# Patient Record
Sex: MALE | Race: WHITE | NOT HISPANIC OR LATINO | Employment: OTHER | URBAN - NONMETROPOLITAN AREA
[De-identification: names, ages, dates, MRNs, and addresses within clinical notes are randomized per-mention and may not be internally consistent; named-entity substitution may affect disease eponyms.]

---

## 2017-01-26 ENCOUNTER — ANESTHESIA EVENT (OUTPATIENT)
Dept: GASTROENTEROLOGY | Facility: HOSPITAL | Age: 62
End: 2017-01-26

## 2017-01-27 ENCOUNTER — TELEPHONE (OUTPATIENT)
Dept: GASTROENTEROLOGY | Facility: CLINIC | Age: 62
End: 2017-01-27

## 2017-01-27 ENCOUNTER — ANESTHESIA (OUTPATIENT)
Dept: GASTROENTEROLOGY | Facility: HOSPITAL | Age: 62
End: 2017-01-27

## 2017-01-27 PROCEDURE — 25010000002 PROPOFOL 10 MG/ML EMULSION: Performed by: NURSE ANESTHETIST, CERTIFIED REGISTERED

## 2017-01-27 RX ORDER — PROPOFOL 10 MG/ML
VIAL (ML) INTRAVENOUS AS NEEDED
Status: DISCONTINUED | OUTPATIENT
Start: 2017-01-27 | End: 2017-01-27 | Stop reason: SURG

## 2017-01-27 RX ORDER — LIDOCAINE HYDROCHLORIDE 20 MG/ML
INJECTION, SOLUTION INFILTRATION; PERINEURAL AS NEEDED
Status: DISCONTINUED | OUTPATIENT
Start: 2017-01-27 | End: 2017-01-27 | Stop reason: SURG

## 2017-01-27 RX ADMIN — PROPOFOL 400 MG: 10 INJECTION, EMULSION INTRAVENOUS at 09:45

## 2017-01-27 RX ADMIN — LIDOCAINE HYDROCHLORIDE 50 MG: 20 INJECTION, SOLUTION INFILTRATION; PERINEURAL at 09:45

## 2017-01-27 NOTE — ANESTHESIA PREPROCEDURE EVALUATION
Anesthesia Evaluation     Patient summary reviewed    No history of anesthetic complications   Airway   Mallampati: II  TM distance: >3 FB  Neck ROM: full  no difficulty expected  Dental - normal exam     Pulmonary - normal exam   Cardiovascular - negative cardio ROS and normal exam    Neuro/Psych- negative ROS  GI/Hepatic/Renal/Endo - negative ROS     Musculoskeletal (-) negative ROS    Abdominal  - normal exam   Substance History - negative use     OB/GYN negative ob/gyn ROS         Other - negative ROS                            Anesthesia Plan    ASA 1     general     intravenous induction   Anesthetic plan and risks discussed with patient.

## 2017-01-27 NOTE — ANESTHESIA POSTPROCEDURE EVALUATION
Patient: Miguelangel Avalos    Procedure Summary     Date Anesthesia Start Anesthesia Stop Room / Location    01/27/17 0944 1005  [facility] /  [facility]       Procedure Diagnosis Surgeon Provider    COLONOSCOPY WITH ANESTHESIA (N/A ) Family hx colonic polyps  (Family hx colonic polyps [Z83.71]) MD Jero Mayo, THOMAS          Anesthesia Type: general  Last vitals  BP      Temp      Pulse     Resp      SpO2        Post Anesthesia Care and Evaluation    Patient location during evaluation: PHASE II  Patient participation: complete - patient participated  Level of consciousness: awake  Pain management: adequate  Airway patency: patent  Anesthetic complications: No anesthetic complications  Respiratory status: acceptable  Hydration status: acceptable

## 2017-12-20 ENCOUNTER — RESULTS ENCOUNTER (OUTPATIENT)
Dept: UROLOGY | Facility: CLINIC | Age: 62
End: 2017-12-20

## 2017-12-20 DIAGNOSIS — N40.0 BENIGN NON-NODULAR PROSTATIC HYPERPLASIA WITHOUT LOWER URINARY TRACT SYMPTOMS: ICD-10-CM

## 2017-12-21 LAB — PSA SERPL-MCNC: 2.37 NG/ML (ref 0–4)

## 2017-12-28 ENCOUNTER — OFFICE VISIT (OUTPATIENT)
Dept: UROLOGY | Facility: CLINIC | Age: 62
End: 2017-12-28

## 2017-12-28 VITALS — TEMPERATURE: 97.3 F | BODY MASS INDEX: 26.81 KG/M2 | WEIGHT: 181 LBS | HEIGHT: 69 IN

## 2017-12-28 DIAGNOSIS — N40.0 BENIGN NON-NODULAR PROSTATIC HYPERPLASIA WITHOUT LOWER URINARY TRACT SYMPTOMS: Primary | ICD-10-CM

## 2017-12-28 LAB
BILIRUB BLD-MCNC: NEGATIVE MG/DL
CLARITY, POC: CLEAR
COLOR UR: YELLOW
GLUCOSE UR STRIP-MCNC: NEGATIVE MG/DL
KETONES UR QL: NEGATIVE
LEUKOCYTE EST, POC: NEGATIVE
NITRITE UR-MCNC: NEGATIVE MG/ML
PH UR: 6.5 [PH] (ref 5–8)
PROT UR STRIP-MCNC: NEGATIVE MG/DL
RBC # UR STRIP: NEGATIVE /UL
SP GR UR: 1.01 (ref 1–1.03)
UROBILINOGEN UR QL: NORMAL

## 2017-12-28 PROCEDURE — 81003 URINALYSIS AUTO W/O SCOPE: CPT | Performed by: UROLOGY

## 2017-12-28 PROCEDURE — 99213 OFFICE O/P EST LOW 20 MIN: CPT | Performed by: UROLOGY

## 2017-12-28 NOTE — PROGRESS NOTES
"  Mr. Avalos is 62 y.o. male    CHIEF COMPLAINT: Follow up BPH    HPI  Benign Prostatic hyperplasia  Patient was initially diagnosed with benign prostatic hyperplasia approximately6 years ago. This was identified in the context of irritative voiding symptoms. Severity of the diease would be mild . This has been managed with Watchful waiting. Watchful waiting has/have stablized the symptoms which is satisfactory to the patient..  His disease has not been complicated by gross hematuria and bladder stones.  He has been diagnosed with \"prostatitis \".  His most bothersome symptom(s) is/are nocturia x 2, urinary frequency, incomplete voiding.  His symptom score is    The following portions of the patient's history were reviewed and updated as appropriate: allergies, current medications, past family history, past medical history, past social history, past surgical history and problem list.    Review of Systems   Constitutional: Negative for chills and fever.   Gastrointestinal: Negative for abdominal pain, anal bleeding and blood in stool.   Genitourinary: Negative for flank pain and hematuria.       No current outpatient prescriptions on file.    No past medical history on file.    Past Surgical History:   Procedure Laterality Date   • COLONOSCOPY  12/17/2009   • COLONOSCOPY N/A 1/27/2017    Procedure: COLONOSCOPY WITH ANESTHESIA;  Surgeon: Rajiv Lazcano MD;  Location[facility];  Service:    • KNEE SURGERY         Social History     Social History   • Marital status:      Spouse name: N/A   • Number of children: N/A   • Years of education: N/A     Social History Main Topics   • Smoking status: Former Smoker   • Smokeless tobacco: Never Used   • Alcohol use Yes      Comment: osscaionally   • Drug use: No   • Sexual activity: Defer     Other Topics Concern   • None     Social History Narrative       Family History   Problem Relation Age of Onset   • Colon polyps Mother        Temp 97.3 °F (36.3 °C)  Ht " "175.3 cm (69\")  Wt 82.1 kg (181 lb)  BMI 26.73 kg/m2    Physical Exam  Alert and oriented ×3  Not agitated or distressed  No obvious deformities  No respiratory distress  Skin without pallor or diaphoresis  ARNALDO: Benign feeling prostate without nodule approximately 25 mL in size.   Penis and testicles are normal           Results for orders placed or performed in visit on 12/28/17   POC Urinalysis Dipstick, Automated   Result Value Ref Range    Color Yellow Yellow, Straw, Dark Yellow, Rose    Clarity, UA Clear Clear    Glucose, UA Negative Negative, 1000 mg/dL (3+) mg/dL    Bilirubin Negative Negative    Ketones, UA Negative Negative    Specific Gravity  1.010 1.005 - 1.030    Blood, UA Negative Negative    pH, Urine 6.5 5.0 - 8.0    Protein, POC Negative Negative mg/dL    Urobilinogen, UA Normal Normal    Leukocytes Negative Negative    Nitrite, UA Negative Negative     Lab Results   Component Value Date    PSA 2.370 12/21/2017         Assessment and Plan  Diagnoses and all orders for this visit:    Benign non-nodular prostatic hyperplasia without lower urinary tract symptoms  -     POC Urinalysis Dipstick, Automated    It is explained the benign prostatic hyperplasia is a chronic urologic condition.  His voiding symptoms are stable on his current regimen. He has remained infection free since his last visit. He has no evidence of progression. We discussed symptoms that would be worrisome of either of these. We talked about the importance of being seen if he develops gross hematuria, burning with urination, or episodes that may be worrisome for inability to empty the bladder. We talked about medications that may increase the risk of urinary retention especially over the counter decongestants. This chronic issue appears stable overall. We will continue to monitor this with history, exam, urinalysis. Any suggestion of progression will require further work-up.     Next year he will bring in his work PSAMichael Quick " MD Sabra  12/28/17  3:19 PM      Cc:

## 2018-01-11 ENCOUNTER — OFFICE VISIT (OUTPATIENT)
Dept: FAMILY MEDICINE CLINIC | Facility: CLINIC | Age: 63
End: 2018-01-11

## 2018-01-11 VITALS
SYSTOLIC BLOOD PRESSURE: 128 MMHG | WEIGHT: 177 LBS | RESPIRATION RATE: 16 BRPM | DIASTOLIC BLOOD PRESSURE: 80 MMHG | HEIGHT: 69 IN | HEART RATE: 78 BPM | TEMPERATURE: 98.7 F | BODY MASS INDEX: 26.22 KG/M2 | OXYGEN SATURATION: 98 %

## 2018-01-11 DIAGNOSIS — J06.9 UPPER RESPIRATORY TRACT INFECTION, UNSPECIFIED TYPE: Primary | ICD-10-CM

## 2018-01-11 DIAGNOSIS — E66.3 OVERWEIGHT (BMI 25.0-29.9): ICD-10-CM

## 2018-01-11 PROCEDURE — 96372 THER/PROPH/DIAG INJ SC/IM: CPT | Performed by: NURSE PRACTITIONER

## 2018-01-11 PROCEDURE — 99203 OFFICE O/P NEW LOW 30 MIN: CPT | Performed by: NURSE PRACTITIONER

## 2018-01-11 RX ORDER — MONTELUKAST SODIUM 10 MG/1
10 TABLET ORAL NIGHTLY
Qty: 30 TABLET | Refills: 0 | Status: SHIPPED | OUTPATIENT
Start: 2018-01-11 | End: 2018-02-07 | Stop reason: SDUPTHER

## 2018-01-11 RX ORDER — FLUTICASONE PROPIONATE 50 MCG
2 SPRAY, SUSPENSION (ML) NASAL DAILY
Qty: 30 BOTTLE | Refills: 0 | Status: SHIPPED | OUTPATIENT
Start: 2018-01-11 | End: 2018-10-01 | Stop reason: SDUPTHER

## 2018-01-11 RX ORDER — AMOXICILLIN 500 MG/1
500 CAPSULE ORAL 2 TIMES DAILY
Qty: 20 CAPSULE | Refills: 0 | Status: SHIPPED | OUTPATIENT
Start: 2018-01-11 | End: 2018-01-21

## 2018-01-11 RX ORDER — DEXAMETHASONE SODIUM PHOSPHATE 10 MG/ML
10 INJECTION INTRAMUSCULAR; INTRAVENOUS ONCE
Status: COMPLETED | OUTPATIENT
Start: 2018-01-11 | End: 2018-01-11

## 2018-01-11 RX ADMIN — DEXAMETHASONE SODIUM PHOSPHATE 10 MG: 10 INJECTION INTRAMUSCULAR; INTRAVENOUS at 15:16

## 2018-01-11 NOTE — PROGRESS NOTES
Chief Complaint   Patient presents with   • URI   • Sore Throat   • ear pressure      HISTORY/ HPI:  Miguelangel Avalos is a 62 y.o.  male who presents  today for an acute complaint of sneezing, ear pressure, sore throat, and cough, onset approximately 6 days ago; has used Zyrtec, nasal saline, a neti pot, and Motrin only without relief of symptoms; was seen 12/23/2017 for similar complaint, the patient reports receiving a IM injection of steroid and a RX for zithromax which he did not fill, symptoms are worse at night with lying down; rates severity of symptoms 6 /10; known exposure to similar illness; the patient has no current medical history; takes no medications regularly, KNDA; former smoker; consumes ETOH occasionally, denies use of illicit drugs; no additional concerns at this time.    Miguelangel Avalos  has no past medical history on file.    No Known Allergies  No current outpatient prescriptions on file.  History reviewed. No pertinent past medical history.  Past Surgical History:   Procedure Laterality Date   • COLONOSCOPY  12/17/2009   • COLONOSCOPY N/A 1/27/2017    Procedure: COLONOSCOPY WITH ANESTHESIA;  Surgeon: Rajiv Lazcano MD;  Location: [facility] ENDOSCOPY;  Service:    • KNEE SURGERY       Social History     Social History   • Marital status:      Spouse name: N/A   • Number of children: N/A   • Years of education: N/A     Social History Main Topics   • Smoking status: Former Smoker   • Smokeless tobacco: Never Used   • Alcohol use Yes      Comment: osscaionally   • Drug use: No   • Sexual activity: Defer     Other Topics Concern   • None     Social History Narrative   • None     Family History   Problem Relation Age of Onset   • Colon polyps Mother    • No Known Problems Father    • No Known Problems Brother    • No Known Problems Daughter    • No Known Problems Daughter    • No Known Problems Daughter      Family history, surgical history, past medical history, Allergies and med's  "reviewed with patient today and updated in [facility] EMR.     ROS:  Review of Systems   Constitutional: Negative for activity change, appetite change, chills, diaphoresis, fatigue and fever.   HENT: Positive for congestion, ear pain (bilateral), postnasal drip, rhinorrhea (clear), sinus pressure (maxillary), sneezing, sore throat and trouble swallowing (discomfort in the am). Negative for ear discharge, sinus pain and voice change.    Eyes: Negative for photophobia, pain, discharge, itching and visual disturbance.   Respiratory: Positive for cough (non-productive). Negative for chest tightness, shortness of breath and wheezing.    Cardiovascular: Negative for chest pain, palpitations and leg swelling.   Gastrointestinal: Negative for abdominal distention, abdominal pain, constipation, diarrhea, nausea and vomiting.   Endocrine: Negative.    Genitourinary: Negative for difficulty urinating.   Musculoskeletal: Negative for myalgias.   Skin: Negative for pallor and rash.   Allergic/Immunologic: Positive for environmental allergies.   Neurological: Negative for dizziness, syncope, weakness, light-headedness and headaches.   Hematological: Negative.    Psychiatric/Behavioral: Negative.    All other systems reviewed and are negative.    OBJECTIVE:  Vitals:    01/11/18 1354   BP: 128/80   BP Location: Right arm   Patient Position: Sitting   Cuff Size: Adult   Pulse: 78   Resp: 16   Temp: 98.7 °F (37.1 °C)   SpO2: 98%   Weight: 80.3 kg (177 lb)   Height: 175.3 cm (69\")     Physical Exam   Constitutional: He is oriented to person, place, and time. He appears well-developed and well-nourished. He is cooperative.  Non-toxic appearance. He does not have a sickly appearance. He does not appear ill. No distress.   Weight today 177 LBS; BMI 26.1   HENT:   Head: Normocephalic.   Right Ear: Hearing, tympanic membrane, external ear and ear canal normal. No drainage, swelling or tenderness. No foreign bodies. No mastoid tenderness. " Tympanic membrane is not injected, not scarred, not perforated, not erythematous, not retracted and not bulging. No middle ear effusion. No decreased hearing is noted.   Left Ear: Hearing, tympanic membrane, external ear and ear canal normal. No drainage, swelling or tenderness. No foreign bodies. No mastoid tenderness. Tympanic membrane is not injected, not scarred, not perforated, not erythematous, not retracted and not bulging.  No middle ear effusion. No decreased hearing is noted.   Nose: Mucosal edema (mild) and rhinorrhea (clear) present. Right sinus exhibits no maxillary sinus tenderness and no frontal sinus tenderness. Left sinus exhibits no maxillary sinus tenderness and no frontal sinus tenderness.   Mouth/Throat: Uvula is midline and mucous membranes are normal. Posterior oropharyngeal erythema (injected) present. No oropharyngeal exudate, posterior oropharyngeal edema or tonsillar abscesses. Tonsils are 0 on the right. Tonsils are 0 on the left. No tonsillar exudate.   Eyes: EOM and lids are normal. Pupils are equal, round, and reactive to light. Right eye exhibits no discharge and no exudate. No foreign body present in the right eye. Left eye exhibits no discharge and no exudate. No foreign body present in the left eye. Right conjunctiva is injected. Right conjunctiva has no hemorrhage. Left conjunctiva is injected. Left conjunctiva has no hemorrhage. No scleral icterus.   Neck: Trachea normal and full passive range of motion without pain. Neck supple. No tracheal tenderness present. No rigidity. No thyroid mass and no thyromegaly present.   Cardiovascular: Normal rate, regular rhythm and normal heart sounds.  Exam reveals no gallop and no friction rub.    No murmur heard.  Pulmonary/Chest: Effort normal and breath sounds normal. No respiratory distress. He has no decreased breath sounds. He has no wheezes. He has no rhonchi. He has no rales. He exhibits no tenderness.   Lymphadenopathy:     He has no  cervical adenopathy.   Neurological: He is alert and oriented to person, place, and time.   Skin: Skin is warm, dry and intact. No rash noted.   Psychiatric: He has a normal mood and affect. His speech is normal and behavior is normal. Thought content normal.   Nursing note and vitals reviewed.    ASSESSMENT/ PLAN:  Miguelangel was seen today for uri, sore throat and ear pressure.    Diagnoses and all orders for this visit:    Upper respiratory tract infection, unspecified type  -     amoxicillin (AMOXIL) 500 MG capsule; Take 1 capsule by mouth 2 (Two) Times a Day for 10 days.  -     fluticasone (FLONASE) 50 MCG/ACT nasal spray; 2 sprays into each nostril Daily.  -     dexamethasone (DECADRON) injection 10 mg; Inject 1 mL into the shoulder, thigh, or buttocks 1 (One) Time.  -     montelukast (SINGULAIR) 10 MG tablet; Take 1 tablet by mouth Every Night for 30 days.    ACUTE URI suspect viral process.  DDX includes viruses to include corona, adeno, parainfluenza, rhinovirus, noninfectious rhinitis (vasomotor and allergic). Reviewed sick contacts, reviewed recent travel.  Reviewed tobacco history.  Avoidance of all tobacco encouraged today.  Discussed abx not recommended for this treatment at present.  The patient voiced understanding. If no improvement in 3-5 days or worsening, they can contact clinic to start antibiotic. Reviewed risks/benefits of Abx discussed today.  Discussed allergies to medications.  Steroid injection discussed with patient today. R/B/A to this reviewed specifically with use in viral URI.  Steroids by mouth d/w patient to include R/B/A.  Nasal GC R/B/A d/w patient. A few OTC options d/w patient.  Can consider Zyrtec every am.  Dayquil per package insert discussed, concern with any BP elevation or history of HTN, DM, CAD.  If no better in 3-5 days or if worsening call and we will consider to start abx. Risks/benefits of current and new medications discussed with the patient today.  The patient is aware  if there any side effects they should stop med's and call or return to clinic.  Appropriate F/U discussed. All questions answered to satisfactory state of patient.  Patient left ambulatory.  Hand washing d/w patient. Cover sneezes/cough.  Avoid work/school for 24 hours if Temp >100.4.   · DECADRON, dexamethasone, methylprednisolone- Pt notified of potential pros/risks of steroid treatment including rapid improvement of condition; allergic reaction, psychologic reaction (depression, anxiety & insomnia), hyperglycemia, skin change at injection site (color, dimpling), muscle weakness.  Pt is aware they may refuse treatment.  · INTRANASAL CORTICOSTEROIDS: The benefit of using an intranasal corticosteroids such as Flonase or Nasonex is to relieve seasonal allergic and non-allergic symptoms such as stuffy nose, rhinorrhea, nasal pruritis, and sneezing.  These medication can additionally relieve symptoms associated with the eyes, such as occular pruritis and lacrimation.  The side effects associated with these medications include nasal dryness and irritation, nausea, vomiting, sore throat, eye pain, or nose bleeds.  If these symptoms occur, discontinue use and call the clinic immediately or go to your nearest emergency department for concerns of a severe side effect or allergic reaction.     Orders Placed Today:   New Medications Ordered This Visit   Medications   • amoxicillin (AMOXIL) 500 MG capsule     Sig: Take 1 capsule by mouth 2 (Two) Times a Day for 10 days.     Dispense:  20 capsule     Refill:  0   • fluticasone (FLONASE) 50 MCG/ACT nasal spray     Si sprays into each nostril Daily.     Dispense:  30 bottle     Refill:  0   • dexamethasone (DECADRON) injection 10 mg   • montelukast (SINGULAIR) 10 MG tablet     Sig: Take 1 tablet by mouth Every Night for 30 days.     Dispense:  30 tablet     Refill:  0      Management Plan:   · Take all medications as prescribed; avoid driving while taking medications that can  cause sedation; any concerns or signs of an adverse reaction to any medication please discontinue and call the clinic immediately or go to your nearest emergency department.  · I spoke with the patient about the benefits and risks associated with antibiotic therapy; the benefits include treating a bacterial infection, preventing the spread of disease, and minimizing serious complications associated with bacterial diseases; the risks include antibiotic resistance, allergic reactions, oral and/ or vaginal candida, and GI related side effects such as an upset stomach and diarrhea, as well as placing the patient at an increase risk for C-diff; some antibiotic may cause GI upset and should be taken with food; verbal acknowledgement of these risk were obtained.  · Tylenol and/ or Motrin PRN per package instruction for any fever or additional pain.   · Increase PO fluids to thin secretions.  · Warm salt water gargles, throat lozenges, or chloraseptic spray for sore throat.    · Proper hand washing and cover mouth when sneezing and/ or coughing.   · The patient is ill today, we will continue to educate the patient on the topics of diet and exercise with the goal of weight loss and preventative illness with each scheduled appointment.   · Follow up as needed for any new or worsening conditions or if symptoms do not resolve as anticipated.      Risks/benefits of current and new medications discussed with the patient and or family today.  The patient/family are aware and accept that if there any side effects they should call or return to clinic as soon as possible.  Appropriate F/U discussed for topics addressed today. All questions were answered to the satisfactory state of patient/family.  Should symptoms fail to improve or worsen they agree to call or return to clinic or to go to the ER. Education handouts were offered on any new Rx if requested.  Discussed the importance of following up with any needed screening  tests/labs/specialist appointments and any requested follow-up recommended by me today.  Importance of maintaining follow-up discussed and patient accepts that missed appointments can delay diagnosis and potentially lead to worsening of conditions.    An After Visit Summary was printed and given to the patient at discharge.    Follow-up: Return in about 1 week (around 1/18/2018), or if symptoms worsen or fail to improve.    Paolo Hurst, APRN 1/11/2018 2:47 PM  This note was electronically signed.

## 2018-01-11 NOTE — PATIENT INSTRUCTIONS
"Upper Respiratory Infection, Adult  Most upper respiratory infections (URIs) are a viral infection of the air passages leading to the lungs. A URI affects the nose, throat, and upper air passages. The most common type of URI is nasopharyngitis and is typically referred to as \"the common cold.\"  URIs run their course and usually go away on their own. Most of the time, a URI does not require medical attention, but sometimes a bacterial infection in the upper airways can follow a viral infection. This is called a secondary infection. Sinus and middle ear infections are common types of secondary upper respiratory infections.  Bacterial pneumonia can also complicate a URI. A URI can worsen asthma and chronic obstructive pulmonary disease (COPD). Sometimes, these complications can require emergency medical care and may be life threatening.   CAUSES  Almost all URIs are caused by viruses. A virus is a type of germ and can spread from one person to another.   RISKS FACTORS  You may be at risk for a URI if:   · You smoke.    · You have chronic heart or lung disease.  · You have a weakened defense (immune) system.    · You are very young or very old.    · You have nasal allergies or asthma.  · You work in crowded or poorly ventilated areas.  · You work in health care facilities or schools.  SIGNS AND SYMPTOMS   Symptoms typically develop 2-3 days after you come in contact with a cold virus. Most viral URIs last 7-10 days. However, viral URIs from the influenza virus (flu virus) can last 14-18 days and are typically more severe. Symptoms may include:   · Runny or stuffy (congested) nose.    · Sneezing.    · Cough.    · Sore throat.    · Headache.    · Fatigue.    · Fever.    · Loss of appetite.    · Pain in your forehead, behind your eyes, and over your cheekbones (sinus pain).  · Muscle aches.    DIAGNOSIS   Your health care provider may diagnose a URI by:  · Physical exam.  · Tests to check that your symptoms are not due to " another condition such as:  ¨ Strep throat.  ¨ Sinusitis.  ¨ Pneumonia.  ¨ Asthma.  TREATMENT   A URI goes away on its own with time. It cannot be cured with medicines, but medicines may be prescribed or recommended to relieve symptoms. Medicines may help:  · Reduce your fever.  · Reduce your cough.  · Relieve nasal congestion.  HOME CARE INSTRUCTIONS   · Take medicines only as directed by your health care provider.    · Gargle warm saltwater or take cough drops to comfort your throat as directed by your health care provider.  · Use a warm mist humidifier or inhale steam from a shower to increase air moisture. This may make it easier to breathe.  · Drink enough fluid to keep your urine clear or pale yellow.    · Eat soups and other clear broths and maintain good nutrition.    · Rest as needed.    · Return to work when your temperature has returned to normal or as your health care provider advises. You may need to stay home longer to avoid infecting others. You can also use a face mask and careful hand washing to prevent spread of the virus.  · Increase the usage of your inhaler if you have asthma.    · Do not use any tobacco products, including cigarettes, chewing tobacco, or electronic cigarettes. If you need help quitting, ask your health care provider.  PREVENTION   The best way to protect yourself from getting a cold is to practice good hygiene.   · Avoid oral or hand contact with people with cold symptoms.    · Wash your hands often if contact occurs.    There is no clear evidence that vitamin C, vitamin E, echinacea, or exercise reduces the chance of developing a cold. However, it is always recommended to get plenty of rest, exercise, and practice good nutrition.   SEEK MEDICAL CARE IF:   · You are getting worse rather than better.    · Your symptoms are not controlled by medicine.    · You have chills.  · You have worsening shortness of breath.  · You have brown or red mucus.  · You have yellow or brown nasal  discharge.  · You have pain in your face, especially when you bend forward.  · You have a fever.  · You have swollen neck glands.  · You have pain while swallowing.  · You have white areas in the back of your throat.  SEEK IMMEDIATE MEDICAL CARE IF:   · You have severe or persistent:    Headache.    Ear pain.    Sinus pain.    Chest pain.  · You have chronic lung disease and any of the following:    Wheezing.    Prolonged cough.    Coughing up blood.    A change in your usual mucus.  · You have a stiff neck.  · You have changes in your:    Vision.    Hearing.    Thinking.    Mood.  MAKE SURE YOU:   · Understand these instructions.  · Will watch your condition.  · Will get help right away if you are not doing well or get worse.     This information is not intended to replace advice given to you by your health care provider. Make sure you discuss any questions you have with your health care provider.     Document Released: 06/13/2002 Document Revised: 05/03/2016 Document Reviewed: 03/25/2015  [employer] Interactive Patient Education ©2017 [employer]

## 2018-01-12 PROBLEM — E66.3 OVERWEIGHT (BMI 25.0-29.9): Status: ACTIVE | Noted: 2018-01-12

## 2018-02-07 DIAGNOSIS — J06.9 UPPER RESPIRATORY TRACT INFECTION, UNSPECIFIED TYPE: ICD-10-CM

## 2018-02-07 RX ORDER — MONTELUKAST SODIUM 10 MG/1
TABLET ORAL
Qty: 30 TABLET | Refills: 0 | Status: SHIPPED | OUTPATIENT
Start: 2018-02-07 | End: 2018-04-24

## 2018-02-07 NOTE — TELEPHONE ENCOUNTER
Patient was seen on 1/11/2018. He was giving a 30 day supply with no refills of this medication on 1/11/2018

## 2018-04-24 ENCOUNTER — OFFICE VISIT (OUTPATIENT)
Dept: FAMILY MEDICINE CLINIC | Facility: CLINIC | Age: 63
End: 2018-04-24

## 2018-04-24 VITALS
OXYGEN SATURATION: 98 % | WEIGHT: 182.2 LBS | TEMPERATURE: 98.6 F | RESPIRATION RATE: 16 BRPM | HEART RATE: 70 BPM | HEIGHT: 69 IN | BODY MASS INDEX: 26.98 KG/M2 | SYSTOLIC BLOOD PRESSURE: 120 MMHG | DIASTOLIC BLOOD PRESSURE: 84 MMHG

## 2018-04-24 DIAGNOSIS — Z11.59 ENCOUNTER FOR HEPATITIS C SCREENING TEST FOR LOW RISK PATIENT: ICD-10-CM

## 2018-04-24 DIAGNOSIS — F51.01 PRIMARY INSOMNIA: Primary | ICD-10-CM

## 2018-04-24 DIAGNOSIS — Z23 ENCOUNTER FOR IMMUNIZATION: ICD-10-CM

## 2018-04-24 DIAGNOSIS — E66.3 OVERWEIGHT (BMI 25.0-29.9): ICD-10-CM

## 2018-04-24 PROCEDURE — 99213 OFFICE O/P EST LOW 20 MIN: CPT | Performed by: NURSE PRACTITIONER

## 2018-04-24 RX ORDER — CETIRIZINE HYDROCHLORIDE 10 MG/1
10 TABLET ORAL DAILY
COMMUNITY
End: 2020-10-07 | Stop reason: SDUPTHER

## 2018-04-24 NOTE — PATIENT INSTRUCTIONS
Insomnia  Insomnia is a sleep disorder that makes it difficult to fall asleep or to stay asleep. Insomnia can cause tiredness (fatigue), low energy, difficulty concentrating, mood swings, and poor performance at work or school.  There are three different ways to classify insomnia:  · Difficulty falling asleep.  · Difficulty staying asleep.  · Waking up too early in the morning.  Any type of insomnia can be long-term (chronic) or short-term (acute). Both are common. Short-term insomnia usually lasts for three months or less. Chronic insomnia occurs at least three times a week for longer than three months.  What are the causes?  Insomnia may be caused by another condition, situation, or substance, such as:  · Anxiety.  · Certain medicines.  · Gastroesophageal reflux disease (GERD) or other gastrointestinal conditions.  · Asthma or other breathing conditions.  · Restless legs syndrome, sleep apnea, or other sleep disorders.  · Chronic pain.  · Menopause. This may include hot flashes.  · Stroke.  · Abuse of alcohol, tobacco, or illegal drugs.  · Depression.  · Caffeine.  · Neurological disorders, such as Alzheimer disease.  · An overactive thyroid (hyperthyroidism).  The cause of insomnia may not be known.  What increases the risk?  Risk factors for insomnia include:  · Gender. Women are more commonly affected than men.  · Age. Insomnia is more common as you get older.  · Stress. This may involve your professional or personal life.  · Income. Insomnia is more common in people with lower income.  · Lack of exercise.  · Irregular work schedule or night shifts.  · Traveling between different time zones.  What are the signs or symptoms?  If you have insomnia, trouble falling asleep or trouble staying asleep is the main symptom. This may lead to other symptoms, such as:  · Feeling fatigued.  · Feeling nervous about going to sleep.  · Not feeling rested in the morning.  · Having trouble concentrating.  · Feeling irritable,  anxious, or depressed.  How is this treated?  Treatment for insomnia depends on the cause. If your insomnia is caused by an underlying condition, treatment will focus on addressing the condition. Treatment may also include:  · Medicines to help you sleep.  · Counseling or therapy.  · Lifestyle adjustments.  Follow these instructions at home:  · Take medicines only as directed by your health care provider.  · Keep regular sleeping and waking hours. Avoid naps.  · Keep a sleep diary to help you and your health care provider figure out what could be causing your insomnia. Include:  ¨ When you sleep.  ¨ When you wake up during the night.  ¨ How well you sleep.  ¨ How rested you feel the next day.  ¨ Any side effects of medicines you are taking.  ¨ What you eat and drink.  · Make your bedroom a comfortable place where it is easy to fall asleep:  ¨ Put up shades or special blackout curtains to block light from outside.  ¨ Use a white noise machine to block noise.  ¨ Keep the temperature cool.  · Exercise regularly as directed by your health care provider. Avoid exercising right before bedtime.  · Use relaxation techniques to manage stress. Ask your health care provider to suggest some techniques that may work well for you. These may include:  ¨ Breathing exercises.  ¨ Routines to release muscle tension.  ¨ Visualizing peaceful scenes.  · Cut back on alcohol, caffeinated beverages, and cigarettes, especially close to bedtime. These can disrupt your sleep.  · Do not overeat or eat spicy foods right before bedtime. This can lead to digestive discomfort that can make it hard for you to sleep.  · Limit screen use before bedtime. This includes:  ¨ Watching TV.  ¨ Using your smartphone, tablet, and computer.  · Stick to a routine. This can help you fall asleep faster. Try to do a quiet activity, brush your teeth, and go to bed at the same time each night.  · Get out of bed if you are still awake after 15 minutes of trying to  "sleep. Keep the lights down, but try reading or doing a quiet activity. When you feel sleepy, go back to bed.  · Make sure that you drive carefully. Avoid driving if you feel very sleepy.  · Keep all follow-up appointments as directed by your health care provider. This is important.  Contact a health care provider if:  · You are tired throughout the day or have trouble in your daily routine due to sleepiness.  · You continue to have sleep problems or your sleep problems get worse.  Get help right away if:  · You have serious thoughts about hurting yourself or someone else.  This information is not intended to replace advice given to you by your health care provider. Make sure you discuss any questions you have with your health care provider.  Document Released: 12/15/2001 Document Revised: 05/19/2017 Document Reviewed: 09/18/2015  [employer] Interactive Patient Education © 2017 [employer]  DASH Eating Plan  DASH stands for \"Dietary Approaches to Stop Hypertension.\" The DASH eating plan is a healthy eating plan that has been shown to reduce high blood pressure (hypertension). It may also reduce your risk for type 2 diabetes, heart disease, and stroke. The DASH eating plan may also help with weight loss.  What are tips for following this plan?  General guidelines   · Avoid eating more than 2,300 mg (milligrams) of salt (sodium) a day. If you have hypertension, you may need to reduce your sodium intake to 1,500 mg a day.  · Limit alcohol intake to no more than 1 drink a day for nonpregnant women and 2 drinks a day for men. One drink equals 12 oz of beer, 5 oz of wine, or 1½ oz of hard liquor.  · Work with your health care provider to maintain a healthy body weight or to lose weight. Ask what an ideal weight is for you.  · Get at least 30 minutes of exercise that causes your heart to beat faster (aerobic exercise) most days of the week. Activities may include walking, swimming, or biking.  · Work with your health care " "provider or diet and nutrition specialist (dietitian) to adjust your eating plan to your individual calorie needs.  Reading food labels   · Check food labels for the amount of sodium per serving. Choose foods with less than 5 percent of the Daily Value of sodium. Generally, foods with less than 300 mg of sodium per serving fit into this eating plan.  · To find whole grains, look for the word \"whole\" as the first word in the ingredient list.  Shopping   · Buy products labeled as \"low-sodium\" or \"no salt added.\"  · Buy fresh foods. Avoid canned foods and premade or frozen meals.  Cooking   · Avoid adding salt when cooking. Use salt-free seasonings or herbs instead of table salt or sea salt. Check with your health care provider or pharmacist before using salt substitutes.  · Do not shah foods. Cook foods using healthy methods such as baking, boiling, grilling, and broiling instead.  · Cook with heart-healthy oils, such as olive, canola, soybean, or sunflower oil.  Meal planning     · Eat a balanced diet that includes:  ¨ 5 or more servings of fruits and vegetables each day. At each meal, try to fill half of your plate with fruits and vegetables.  ¨ Up to 6-8 servings of whole grains each day.  ¨ Less than 6 oz of lean meat, poultry, or fish each day. A 3-oz serving of meat is about the same size as a deck of cards. One egg equals 1 oz.  ¨ 2 servings of low-fat dairy each day.  ¨ A serving of nuts, seeds, or beans 5 times each week.  ¨ Heart-healthy fats. Healthy fats called Omega-3 fatty acids are found in foods such as flaxseeds and coldwater fish, like sardines, salmon, and mackerel.  · Limit how much you eat of the following:  ¨ Canned or prepackaged foods.  ¨ Food that is high in trans fat, such as fried foods.  ¨ Food that is high in saturated fat, such as fatty meat.  ¨ Sweets, desserts, sugary drinks, and other foods with added sugar.  ¨ Full-fat dairy products.  · Do not salt foods before eating.  · Try to eat " at least 2 vegetarian meals each week.  · Eat more home-cooked food and less restaurant, buffet, and fast food.  · When eating at a restaurant, ask that your food be prepared with less salt or no salt, if possible.  What foods are recommended?  The items listed may not be a complete list. Talk with your dietitian about what dietary choices are best for you.  Grains   Whole-grain or whole-wheat bread. Whole-grain or whole-wheat pasta. Brown rice. Oatmeal. Quinoa. Bulgur. Whole-grain and low-sodium cereals. Rika bread. Low-fat, low-sodium crackers. Whole-wheat flour tortillas.  Vegetables   Fresh or frozen vegetables (raw, steamed, roasted, or grilled). Low-sodium or reduced-sodium tomato and vegetable juice. Low-sodium or reduced-sodium tomato sauce and tomato paste. Low-sodium or reduced-sodium canned vegetables.  Fruits   All fresh, dried, or frozen fruit. Canned fruit in natural juice (without added sugar).  Meat and other protein foods   Skinless chicken or turkey. Ground chicken or turkey. Pork with fat trimmed off. Fish and seafood. Egg whites. Dried beans, peas, or lentils. Unsalted nuts, nut butters, and seeds. Unsalted canned beans. Lean cuts of beef with fat trimmed off. Low-sodium, lean deli meat.  Dairy   Low-fat (1%) or fat-free (skim) milk. Fat-free, low-fat, or reduced-fat cheeses. Nonfat, low-sodium ricotta or cottage cheese. Low-fat or nonfat yogurt. Low-fat, low-sodium cheese.  Fats and oils   Soft margarine without trans fats. Vegetable oil. Low-fat, reduced-fat, or light mayonnaise and salad dressings (reduced-sodium). Canola, safflower, olive, soybean, and sunflower oils. Avocado.  Seasoning and other foods   Herbs. Spices. Seasoning mixes without salt. Unsalted popcorn and pretzels. Fat-free sweets.  What foods are not recommended?  The items listed may not be a complete list. Talk with your dietitian about what dietary choices are best for you.  Grains   Baked goods made with fat, such as  croissants, muffins, or some breads. Dry pasta or rice meal packs.  Vegetables   Creamed or fried vegetables. Vegetables in a cheese sauce. Regular canned vegetables (not low-sodium or reduced-sodium). Regular canned tomato sauce and paste (not low-sodium or reduced-sodium). Regular tomato and vegetable juice (not low-sodium or reduced-sodium). Pickles. Olives.  Fruits   Canned fruit in a light or heavy syrup. Fried fruit. Fruit in cream or butter sauce.  Meat and other protein foods   Fatty cuts of meat. Ribs. Fried meat. Brooks. Sausage. Bologna and other processed lunch meats. Salami. Fatback. Hotdogs. Bratwurst. Salted nuts and seeds. Canned beans with added salt. Canned or smoked fish. Whole eggs or egg yolks. Chicken or turkey with skin.  Dairy   Whole or 2% milk, cream, and half-and-half. Whole or full-fat cream cheese. Whole-fat or sweetened yogurt. Full-fat cheese. Nondairy creamers. Whipped toppings. Processed cheese and cheese spreads.  Fats and oils   Butter. Stick margarine. Lard. Shortening. Ghee. Brooks fat. Tropical oils, such as coconut, palm kernel, or palm oil.  Seasoning and other foods   Salted popcorn and pretzels. Onion salt, garlic salt, seasoned salt, table salt, and sea salt. Worcestershire sauce. Tartar sauce. Barbecue sauce. Teriyaki sauce. Soy sauce, including reduced-sodium. Steak sauce. Canned and packaged gravies. Fish sauce. Oyster sauce. Cocktail sauce. Horseradish that you find on the shelf. Ketchup. Mustard. Meat flavorings and tenderizers. Bouillon cubes. Hot sauce and Tabasco sauce. Premade or packaged marinades. Premade or packaged taco seasonings. Relishes. Regular salad dressings.  Where to find more information:  · National Heart, [employer] [place]: www.nhlbi.nih.gov  · American Heart Association: www.heart.org  Summary  · The DASH eating plan is a healthy eating plan that has been shown to reduce high blood pressure (hypertension). It may also reduce your risk for  type 2 diabetes, heart disease, and stroke.  · With the DASH eating plan, you should limit salt (sodium) intake to 2,300 mg a day. If you have hypertension, you may need to reduce your sodium intake to 1,500 mg a day.  · When on the DASH eating plan, aim to eat more fresh fruits and vegetables, whole grains, lean proteins, low-fat dairy, and heart-healthy fats.  · Work with your health care provider or diet and nutrition specialist (dietitian) to adjust your eating plan to your individual calorie needs.  This information is not intended to replace advice given to you by your health care provider. Make sure you discuss any questions you have with your health care provider.  Document Released: 12/06/2012 Document Revised: 12/11/2017 Document Reviewed: 12/11/2017  [employer] Interactive Patient Education © 2017 [employer]  Shingles Vaccine: What You Need to Know  1. What is shingles?  Shingles is a painful skin rash, often with blisters. It is also called Herpes Zoster, or just Zoster.  A shingles rash usually appears on one side of the face or body and lasts from 2 to 4 weeks. Its main symptom is pain, which can be quite severe. Other symptoms of shingles can include fever, headache, chills and upset stomach. Very rarely, a shingles infection can lead to pneumonia, hearing problems, blindness, brain inflammation (encephalitis) or death.  For about 1 person in 5, severe pain can continue even long after the rash clears up. This is called post-herpetic neuralgia.  Shingles is caused by the Varicella Zoster virus, the same virus that causes chickenpox.  Only someone who has had chickenpox--or, rarely, has gotten chickenpox vaccine--can get shingles. The virus stays in your body, and can cause shingles many years later.  You can't catch shingles from another person with shingles. However, a person who has never had chickenpox (or chicken pox vaccine) could get chickenpox from someone with shingles. This is not very  common.  Shingles is far more common in people 50 years of age and older than in younger people. It is also more common in people whose immune systems are weakened because of a disease such as cancer, or drugs such as steroids or chemotherapy.  At least 1 million people a year in the [place] get shingles.  2. Shingles vaccine  A vaccine for shingles was licensed in 2006. In clinical trials, the vaccine reduced the risk of shingles by 50%. It can also reduce pain in people who still get shingles after being vaccinated.  A single dose of shingles vaccine is recommended for adults 60 years of age and older.  3. Some people should not get shingles vaccine or should wait  A person should not get shingles vaccine who:  has ever had a life-threatening allergic reaction to gelatin, the antibiotic neomycin, or any other component of shingles vaccine. Tell your doctor if you have any severe allergies.  has a weakened immune system because of current:  AIDS or another disease that affects the immune system,  treatment with drugs that affect the immune system, such as prolonged use of high-dose steroids,  cancer treatment such as radiation or chemotherapy,  cancer affecting the bone marrow or lymphatic system, such as leukemia or lymphoma.  is pregnant, or might be pregnant. Women should not become pregnant until at least 4 weeks after getting shingles vaccine.  Someone with a minor acute illness, such as a cold, may be vaccinated. But anyone with a moderate or severe acute illness should usually wait until they recover before get ting the vaccine. This includes anyone with a temperature of 101.3° F or higher.  4. What are the risks from shingles vaccine?  A vaccine, like any medicine, could possibly cause serious problems, such as severe allergic reactions. However, the risk of a vaccine causing serious harm, or death, is extremely small.  No serious problems have been identified with shingles vaccine.  Mild problems    Redness, soreness, swelling, or itching at the site of the injection (about 1 person in 3).  Headache (about 1 person in 70).  Like all vaccines, shingles vaccine is being closely monitored for unusual or severe problems.  5. What if there is a serious reaction?  What should I look for?   Look for anything that concerns you, such as signs of a severe allergic reaction, very high fever, or behavior changes.  Signs of a severe allergic reaction can include hives, swelling of the face and throat, difficulty breathing, a fast heartbeat, dizziness, and weakness. These would start a few minutes to a few hours after the vaccination.  What should I do?   If you think it is a severe allergic reaction or other emergency that can't wait, call 9-1-1 or get the person to the nearest hospital. Otherwise, call your doctor.  Afterward, the reaction should be reported to the Vaccine Adverse Event Reporting System (VAERS). Your doctor might file this report, or you can do it yourself through the VAERS web site at www.vaers.[email or web address].gov or by calling 1-293.269.9057.  VAERS is only for reporting reactions. They do not give medical advice.   6. How can I learn more?  Ask your doctor.  Call your local or state health department.  Contact the Centers for Disease Control and Prevention (CDC):  Call 1-157.231.3175(3-402-RBA-INFO) or  Visit CDC's website at www.cdc.gov/vaccines  CDC Vaccine Information Statement (VIS) Shingles Vaccine (10/6/2009)  This information is not intended to replace advice given to you by your health care provider. Make sure you discuss any questions you have with your health care provider.  Document Released: 10/15/2007 Document Revised: 11/11/2017 Document Reviewed: 11/11/2017  [employer] Interactive Patient Education © 2017 [employer]

## 2018-04-24 NOTE — PROGRESS NOTES
Chief Complaint   Patient presents with   • Other     Patient is here today to discuss labs for Hep and to discuss sleep problems.      HISTORY/ HPI:  Miguelangel Avalos is a 63 y.o.  male who presents today for an complaint of intermittent insomnia; the patient reports concerns with frequent awakenings and problems maintaining asleep, occurs approximately 2 days per week; gradually worsened over the past 10 years; denies undue stress; exercises 3 days per week at lunch; avoids caffeine in the evenings; avoids late night meals, consumes approximately 40 ounces of water with evening meal; routinely goes to bed at 10-10:30 PM; typically takes 5-15 minutes to fall asleep; avoid screen time in bed; reports some snoring, no complaint from spouse; denies apnea or reports of apnea from spouse while sleeping; reports frequent nocturia, occurring 2-3 times per night; denies GERD like symptoms that occur at night; denies orthopnea; takes Zyrtec intermittently at night that aids with sleep; currently rates severity of symptoms 7/10; the patient states he often does not feel well rested; denies excessive daytime drowsiness; denies recent illnesses; additional requesting Rx for the shingles vaccine.    Miguelangel Avalos  has no past medical history on file.    No Known Allergies    Current Outpatient Prescriptions:   •  cetirizine (zyrTEC) 10 MG tablet, Take 10 mg by mouth Daily., Disp: , Rfl:   •  fluticasone (FLONASE) 50 MCG/ACT nasal spray, 2 sprays into each nostril Daily., Disp: 30 bottle, Rfl: 0  History reviewed. No pertinent past medical history.  Past Surgical History:   Procedure Laterality Date   • COLONOSCOPY  12/17/2009   • COLONOSCOPY N/A 1/27/2017    Procedure: COLONOSCOPY WITH ANESTHESIA;  Surgeon: Rajiv Lazcano MD;  Location: [facility] ENDOSCOPY;  Service:    • KNEE SURGERY       Social History     Social History   • Marital status:      Social History Main Topics   • Smoking status: Former Smoker   •  Smokeless tobacco: Never Used   • Alcohol use Yes      Comment: osscaionally   • Drug use: No   • Sexual activity: Defer     Other Topics Concern   • Not on file     Family History   Problem Relation Age of Onset   • Colon polyps Mother    • No Known Problems Father    • No Known Problems Brother    • No Known Problems Daughter    • No Known Problems Daughter    • No Known Problems Daughter      Family history, surgical history, past medical history, Allergies and med's reviewed with patient today and updated in [facility] EMR.     ROS:  Review of Systems   Constitutional: Negative.  Negative for activity change, appetite change, chills, diaphoresis, fatigue and fever.   HENT: Positive for postnasal drip and sneezing. Negative for facial swelling, hearing loss and mouth sores.    Eyes: Positive for discharge and itching. Negative for photophobia, pain, redness and visual disturbance.   Respiratory: Negative.  Negative for cough, chest tightness, shortness of breath and wheezing.    Cardiovascular: Negative.  Negative for chest pain, palpitations and leg swelling.   Gastrointestinal: Negative.  Negative for abdominal distention, abdominal pain, blood in stool, constipation, diarrhea, nausea and vomiting.   Endocrine: Negative.  Negative for cold intolerance, heat intolerance, polydipsia, polyphagia and polyuria.   Genitourinary: Positive for frequency (nocturia). Negative for decreased urine volume, difficulty urinating, hematuria and urgency.   Musculoskeletal: Negative.  Negative for arthralgias, back pain, joint swelling, myalgias, neck pain and neck stiffness.   Skin: Negative.  Negative for color change, pallor, rash and wound.   Allergic/Immunologic: Positive for environmental allergies.   Neurological: Negative.  Negative for dizziness, syncope, weakness, light-headedness, numbness and headaches.   Hematological: Negative.  Negative for adenopathy. Does not bruise/bleed easily.   Psychiatric/Behavioral: Positive for  "sleep disturbance. Negative for behavioral problems, decreased concentration, dysphoric mood, self-injury and suicidal ideas. The patient is not nervous/anxious and is not hyperactive.      OBJECTIVE:  Vitals:    04/24/18 0801   BP: 120/84   BP Location: Left arm   Patient Position: Sitting   Cuff Size: Adult   Pulse: 70   Resp: 16   Temp: 98.6 °F (37 °C)   TempSrc: Oral   SpO2: 98%   Weight: 82.6 kg (182 lb 3.2 oz)   Height: 175.3 cm (69\")     Physical Exam   Constitutional: He is oriented to person, place, and time. He appears well-developed and well-nourished. He is cooperative.  Non-toxic appearance. He does not have a sickly appearance. He does not appear ill. No distress.   Weight 182.3 LBS; BMI 26.9   HENT:   Head: Normocephalic.   Right Ear: Hearing normal. No mastoid tenderness. No decreased hearing is noted.   Left Ear: Hearing normal. No mastoid tenderness. No decreased hearing is noted.   Nose: Nose normal. No mucosal edema or rhinorrhea.   Mouth/Throat: Uvula is midline, oropharynx is clear and moist and mucous membranes are normal. No oral lesions. No uvula swelling or dental caries. No oropharyngeal exudate, posterior oropharyngeal edema, posterior oropharyngeal erythema or tonsillar abscesses. Tonsils are 0 on the right. Tonsils are 0 on the left. No tonsillar exudate.   Eyes: Conjunctivae, EOM and lids are normal. Pupils are equal, round, and reactive to light. Right eye exhibits no discharge, no exudate and no hordeolum. No foreign body present in the right eye. Left eye exhibits no discharge, no exudate and no hordeolum. No foreign body present in the left eye. Right conjunctiva is not injected. Right conjunctiva has no hemorrhage. Left conjunctiva is not injected. Left conjunctiva has no hemorrhage. No scleral icterus. Right eye exhibits no nystagmus. Left eye exhibits no nystagmus.   Neck: Trachea normal, normal range of motion and full passive range of motion without pain. Neck supple. No JVD " present. No tracheal tenderness, no spinous process tenderness and no muscular tenderness present. Carotid bruit is not present. No no neck rigidity. No edema, no erythema and normal range of motion present. No Brudzinski's sign noted. No thyroid mass and no thyromegaly present.   Cardiovascular: Normal rate, regular rhythm, normal heart sounds and normal pulses.  Exam reveals no gallop, no distant heart sounds and no friction rub.    No murmur heard.  Pulses:       Carotid pulses are 2+ on the right side, and 2+ on the left side.       Radial pulses are 2+ on the right side, and 2+ on the left side.        Posterior tibial pulses are 2+ on the right side, and 2+ on the left side.   Pulmonary/Chest: Effort normal and breath sounds normal. No respiratory distress. He has no decreased breath sounds. He has no wheezes. He has no rhonchi. He has no rales. He exhibits no tenderness.   Lymphadenopathy:     He has no cervical adenopathy.   Neurological: He is alert and oriented to person, place, and time. GCS eye subscore is 4. GCS verbal subscore is 5. GCS motor subscore is 6.   Skin: Skin is warm, dry and intact. Capillary refill takes less than 2 seconds. No rash noted. He is not diaphoretic. No cyanosis. No pallor. Nails show no clubbing.   Psychiatric: He has a normal mood and affect. His speech is normal and behavior is normal. Thought content normal. His mood appears not anxious. His affect is not angry, not blunt, not labile and not inappropriate. He is not agitated, not aggressive, not hyperactive, not slowed, not withdrawn, not actively hallucinating and not combative. Thought content is not paranoid and not delusional. He does not express impulsivity or inappropriate judgment. He does not exhibit a depressed mood. He expresses no homicidal and no suicidal ideation. He expresses no suicidal plans and no homicidal plans. He is attentive.   Nursing note and vitals reviewed.    ASSESSMENT/ PLAN:  Miguelangel was seen  today for other.    Diagnoses and all orders for this visit:    Primary insomnia    Encounter for immunization    Encounter for hepatitis C screening test for low risk patient  -     HCV Antibody With / Rflx To Verification    Overweight (BMI 25.0-29.9)    Orders Placed Today:   New Medications Ordered This Visit   Medications   • zoster vaccine live (ZOSTAVAX) 80461 UNT/0.65ML reconstituted suspension     Sig: Inject 1 dose under the skin 1 (One) Time for 1 dose.     Dispense:  1 each     Refill:  0      Management Plan:     1.  Insomnia  I considered specific disorders for sleep problems such as obstructive sleep apnea, delayed sleep phase syndrome, poor sleep hygiene, lifestyle, and conditioned insomnia as a possible cause of sleep problems in this patient. This is a partial list of diagnoses considered.  We discussed sleep hygiene: I advised the patient to not go to bed until ready to go to sleep; avoid screen time (television and cell phone use) while in the bed; avoid ETOH, avoid late evening meals, and avoid consuming large amounts of PO fluids in the evening to decrease episodes of  Nocturia.  We discussed routine labs to include a PSA, the patient reports having labs obtained earlier this month through employer and will bring copy for my review.  We additionally discussed OTC medications such as Benadryl or Melatonin as a sleep aid.  I advised the patient to take as directed per package instruction; for any concerns of adverse effects from medication I advised the patient to seek immediate care at a local ED.  If symptoms continue or worsen I advised the patient to return with spouse to review [place] sleepiness scale and a potential referral for a sleep study to rule out the possibility of CHYNA.     2. Encounter for immunization  Per the patients request, an order for the Shingles vaccine was sent to the patients pharmacy.  We discussed the adverse effects and contraindications of this immunization.   Additional information provided in AVS.     3. Encounter for hepatitis C screening test for low risk patient  Per the USPSTF recommendations, a hepatitis C screening will be obtained and the patient will be notified of the results.      4. Overweight (BMI 25.0-29.9)   I encouraged the patient to continue to make attempts at eating a low calorie, low carbohydrate, low sugar, and low sodium diet, and avoid sweets and sweetened beverages.   I encouraged the client to continue to make attempts at daily exercise for weight loss.  Information on the DASH diet provided in the AVS.  We will continue to discuss diet and exercise with the goal of weight loss at each scheduled appointment; if there is no change/ reduction in weight I will discuss referring the patient to a dietitian.     Risks/benefits of current and new medications discussed with the patient and or family today.  The patient/family are aware and accept that if there any side effects they should call or return to clinic as soon as possible.  Appropriate F/U discussed for topics addressed today. All questions were answered to the satisfactory state of patient/family.  Should symptoms fail to improve or worsen they agree to call or return to clinic or to go to the ER. Education handouts were offered on any new Rx if requested.  Discussed the importance of following up with any needed screening tests/labs/specialist appointments and any requested follow-up recommended by me today.  Importance of maintaining follow-up discussed and patient accepts that missed appointments can delay diagnosis and potentially lead to worsening of conditions.    An After Visit Summary was printed and given to the patient at discharge.    Follow-up: Return if symptoms worsen or fail to improve.    Paolo Hurst, MELANIE 4/24/2018 8:15 AM  This note was electronically signed.

## 2018-05-01 ENCOUNTER — TELEPHONE (OUTPATIENT)
Dept: FAMILY MEDICINE CLINIC | Facility: CLINIC | Age: 63
End: 2018-05-01

## 2018-05-01 DIAGNOSIS — Z23 NEED FOR VIRAL IMMUNIZATION: Primary | ICD-10-CM

## 2018-05-01 NOTE — TELEPHONE ENCOUNTER
Patient called states that he had went to  the shingles vaccine and it was the old vaccine he wanted the Shingrix if you would please review and advise. Thanks

## 2018-05-02 LAB
HCV AB S/CO SERPL IA: <0.1 S/CO RATIO (ref 0–0.9)
LABORATORY COMMENT REPORT: NORMAL

## 2018-06-16 DIAGNOSIS — R07.9 CHEST PAIN SYNDROME: ICD-10-CM

## 2018-06-16 DIAGNOSIS — R94.31 ABNORMAL EKG: ICD-10-CM

## 2018-06-16 DIAGNOSIS — R00.1 BRADYCARDIA: ICD-10-CM

## 2018-06-18 PROBLEM — R07.89 OTHER CHEST PAIN: Status: ACTIVE | Noted: 2018-06-18

## 2018-06-18 PROBLEM — F17.201 TOBACCO ABUSE, IN REMISSION: Status: ACTIVE | Noted: 2018-06-18

## 2018-06-19 ENCOUNTER — OFFICE VISIT (OUTPATIENT)
Dept: CARDIOLOGY | Age: 63
End: 2018-06-19
Payer: COMMERCIAL

## 2018-06-19 VITALS
BODY MASS INDEX: 26.51 KG/M2 | WEIGHT: 179 LBS | HEIGHT: 69 IN | DIASTOLIC BLOOD PRESSURE: 76 MMHG | HEART RATE: 55 BPM | SYSTOLIC BLOOD PRESSURE: 120 MMHG

## 2018-06-19 DIAGNOSIS — Z92.29 FEN-PHEN EXPOSURE: ICD-10-CM

## 2018-06-19 DIAGNOSIS — Z91.89 FEN-PHEN EXPOSURE: ICD-10-CM

## 2018-06-19 DIAGNOSIS — F17.201 TOBACCO ABUSE, IN REMISSION: ICD-10-CM

## 2018-06-19 DIAGNOSIS — R07.89 OTHER CHEST PAIN: ICD-10-CM

## 2018-06-19 PROCEDURE — 93000 ELECTROCARDIOGRAM COMPLETE: CPT | Performed by: INTERNAL MEDICINE

## 2018-06-19 PROCEDURE — 99202 OFFICE O/P NEW SF 15 MIN: CPT | Performed by: INTERNAL MEDICINE

## 2018-06-19 RX ORDER — M-VIT,TX,IRON,MINS/CALC/FOLIC 27MG-0.4MG
1 TABLET ORAL DAILY
COMMUNITY
End: 2020-06-22

## 2018-06-19 RX ORDER — CETIRIZINE HYDROCHLORIDE 10 MG/1
10 TABLET ORAL PRN
COMMUNITY

## 2018-06-19 ASSESSMENT — ENCOUNTER SYMPTOMS
NAUSEA: 0
SHORTNESS OF BREATH: 0
WHEEZING: 0
BACK PAIN: 0
CHEST TIGHTNESS: 0
CHOKING: 0
APNEA: 0
STRIDOR: 0
BLOOD IN STOOL: 0
ABDOMINAL DISTENTION: 0

## 2018-10-01 DIAGNOSIS — J06.9 UPPER RESPIRATORY TRACT INFECTION, UNSPECIFIED TYPE: ICD-10-CM

## 2018-10-01 RX ORDER — FLUTICASONE PROPIONATE 50 MCG
SPRAY, SUSPENSION (ML) NASAL
Qty: 32 ML | Refills: 0 | Status: SHIPPED | OUTPATIENT
Start: 2018-10-01

## 2019-06-18 ENCOUNTER — OFFICE VISIT (OUTPATIENT)
Dept: CARDIOLOGY | Age: 64
End: 2019-06-18
Payer: COMMERCIAL

## 2019-06-18 VITALS
SYSTOLIC BLOOD PRESSURE: 110 MMHG | HEIGHT: 69 IN | BODY MASS INDEX: 26.66 KG/M2 | WEIGHT: 180 LBS | HEART RATE: 68 BPM | DIASTOLIC BLOOD PRESSURE: 76 MMHG

## 2019-06-18 DIAGNOSIS — Z00.00 ANNUAL PHYSICAL EXAM: ICD-10-CM

## 2019-06-18 DIAGNOSIS — R00.1 BRADYCARDIA: Primary | ICD-10-CM

## 2019-06-18 PROCEDURE — 93000 ELECTROCARDIOGRAM COMPLETE: CPT | Performed by: NURSE PRACTITIONER

## 2019-06-18 PROCEDURE — 99213 OFFICE O/P EST LOW 20 MIN: CPT | Performed by: NURSE PRACTITIONER

## 2019-06-18 NOTE — PROGRESS NOTES
Bradycardia  Stable   Review and summation of old records:    EKG in the office showing sinus bradycardia with a heart rate of 53 bpm.  Patient is a very active individual and exercises daily. No Continue current medications:     NA           2. History of Fen/Phen use  Stable   No murmur auscultated on exam.  Patient without any complaints of shortness of breath. No Continue current medications: Yes                                     Orders Placed This Encounter   Procedures    EKG 12 lead     Order Specific Question:   Reason for Exam?     Answer: Other     No orders of the defined types were placed in this encounter. Discussed with patient. Return in about 1 year (around 6/18/2020) for Dr Jaquelin Daniel . I greatly appreciate the opportunity to meet Steve Christopher and your confidence in allowing me to participate in his cardiovascular care.     JUANA Dixon NP  6/18/2019 8:59 AM

## 2020-06-22 ENCOUNTER — OFFICE VISIT (OUTPATIENT)
Dept: CARDIOLOGY | Age: 65
End: 2020-06-22
Payer: COMMERCIAL

## 2020-06-22 VITALS
WEIGHT: 176 LBS | DIASTOLIC BLOOD PRESSURE: 56 MMHG | HEIGHT: 69 IN | SYSTOLIC BLOOD PRESSURE: 96 MMHG | HEART RATE: 57 BPM | BODY MASS INDEX: 26.07 KG/M2

## 2020-06-22 PROCEDURE — 93000 ELECTROCARDIOGRAM COMPLETE: CPT | Performed by: INTERNAL MEDICINE

## 2020-06-22 PROCEDURE — 99212 OFFICE O/P EST SF 10 MIN: CPT | Performed by: INTERNAL MEDICINE

## 2020-10-07 ENCOUNTER — OFFICE VISIT (OUTPATIENT)
Dept: INTERNAL MEDICINE | Facility: CLINIC | Age: 65
End: 2020-10-07

## 2020-10-07 VITALS
HEIGHT: 69 IN | BODY MASS INDEX: 26.27 KG/M2 | OXYGEN SATURATION: 99 % | RESPIRATION RATE: 18 BRPM | SYSTOLIC BLOOD PRESSURE: 115 MMHG | HEART RATE: 70 BPM | TEMPERATURE: 98.2 F | WEIGHT: 177.38 LBS | DIASTOLIC BLOOD PRESSURE: 76 MMHG

## 2020-10-07 DIAGNOSIS — B07.0 PLANTAR WART OF LEFT FOOT: Primary | ICD-10-CM

## 2020-10-07 PROCEDURE — 99203 OFFICE O/P NEW LOW 30 MIN: CPT | Performed by: NURSE PRACTITIONER

## 2020-10-07 RX ORDER — CETIRIZINE HYDROCHLORIDE 10 MG/1
TABLET ORAL
COMMUNITY

## 2020-10-07 NOTE — PROGRESS NOTES
Subjective     Chief Complaint   Patient presents with   • Establish Care   • Wart     Left Foot        History of Present Illness  Pt comes in today to establish care. States he is in good health. He only takes medications for allergies and vitamins. He just had physical labs in April. He has emailed me these. His only complaint is that he has a plantar foot on the ball of his left foot. Has been there for years, but more recently is causing pain with walking. He and his wife were at the beach a couple weeks ago and noticed that walking on the sand made it worse.     Review of Systems   Constitutional: Negative for appetite change, fatigue and unexpected weight change.   HENT: Positive for postnasal drip and tinnitus ( chronic).    Eyes: Positive for visual disturbance ( wears glasses). Negative for pain and itching.   Respiratory: Negative for cough, chest tightness, shortness of breath and wheezing.    Cardiovascular: Negative for chest pain and palpitations.   Gastrointestinal: Negative for abdominal pain, diarrhea, nausea and vomiting.   Endocrine: Positive for cold intolerance. Negative for polydipsia, polyphagia and polyuria.   Genitourinary: Negative for dysuria, enuresis, hematuria, penile pain and scrotal swelling.   Musculoskeletal: Positive for arthralgias ( right shoulder pain related to old football injury).   Skin:        Eczema related to allergies.    Allergic/Immunologic: Positive for environmental allergies.   Neurological: Negative for seizures, syncope and headaches.   Hematological: Bruises/bleeds easily.   Psychiatric/Behavioral: Negative for confusion and sleep disturbance. The patient is not nervous/anxious.       Past Medical History:   Past Medical History:   Diagnosis Date   • Allergic      Past Surgical History:  Past Surgical History:   Procedure Laterality Date   • COLONOSCOPY  12/17/2009   • COLONOSCOPY N/A 1/27/2017    Procedure: COLONOSCOPY WITH ANESTHESIA;  Surgeon: Rajiv  "YAMILEX Lazcano MD;  Location: [facility] ENDOSCOPY;  Service:    • KNEE SURGERY       Social History:  reports that he has quit smoking. He has never used smokeless tobacco. He reports current alcohol use. He reports that he does not use drugs.    Family History: family history includes Colon polyps in his mother; No Known Problems in his brother, daughter, daughter, daughter, and father.      Allergies:  No Known Allergies  Medications:  Prior to Admission medications    Medication Sig Start Date End Date Taking? Authorizing Provider   cetirizine (ZyrTEC Allergy) 10 MG tablet Zyrtec 10 mg tablet   Take 1 tablet every day by oral route.    ProviderBrisa MD   fluticasone (FLONASE) 50 MCG/ACT nasal spray INSTILL 2 SPRAYS INTO EACH NOSTRIL DAILY. 10/1/18   Paolo Hurst APRN   cetirizine (zyrTEC) 10 MG tablet Take 10 mg by mouth Daily.  10/7/20  ProviderBrisa MD       Objective     Vital Signs: /76 (BP Location: Right arm, Patient Position: Sitting, Cuff Size: Adult)   Pulse 70   Temp 98.2 °F (36.8 °C) (Skin)   Resp 18   Ht 175.3 cm (69\")   Wt 80.5 kg (177 lb 6 oz)   SpO2 99%   BMI 26.19 kg/m²   Physical Exam  Vitals signs reviewed.   Constitutional:       Appearance: He is well-developed.   HENT:      Head: Normocephalic and atraumatic.      Nose: Nose normal.   Eyes:      Pupils: Pupils are equal, round, and reactive to light.   Neck:      Musculoskeletal: Normal range of motion and neck supple.      Vascular: No JVD.   Cardiovascular:      Rate and Rhythm: Normal rate and regular rhythm.   Pulmonary:      Effort: Pulmonary effort is normal.      Breath sounds: Normal breath sounds.   Abdominal:      General: Bowel sounds are normal.      Palpations: Abdomen is soft.   Musculoskeletal: Normal range of motion.         General: No deformity.   Lymphadenopathy:      Cervical: No cervical adenopathy.   Skin:     General: Skin is warm and dry.      Comments: Plantar wart in between 3rd and 4th toes " left foot.    Neurological:      Mental Status: He is alert and oriented to person, place, and time.   Psychiatric:         Behavior: Behavior normal.         Thought Content: Thought content normal.         Judgment: Judgment normal.       Patient's Body mass index is 26.19 kg/m². BMI is within normal parameters. No follow-up required..    Results Reviewed:  No results found for: GLUCOSE, BUN, CREATININE, NA, K, CL, CO2, CALCIUM, ALT, AST, WBC, HCT, PLT, CHOL, TRIG, HDL, LDL, LDLHDL, HGBA1C      Assessment / Plan     Assessment/Plan:  Miguelangel was seen today for establish care and wart.    Diagnoses and all orders for this visit:    Plantar wart of left foot  -     Ambulatory Referral to Podiatry      No follow-ups on file. unless patient needs to be seen sooner or acute issues arise.    Code Status: Full.     I have discussed the patient results/orders and and plan/recommendation with them at today's visit.      Yuliana Fu, MELANIE   10/07/2020

## 2020-10-26 ENCOUNTER — OFFICE VISIT (OUTPATIENT)
Dept: PODIATRY | Facility: CLINIC | Age: 65
End: 2020-10-26

## 2020-10-26 VITALS
WEIGHT: 179.8 LBS | DIASTOLIC BLOOD PRESSURE: 65 MMHG | HEIGHT: 69 IN | OXYGEN SATURATION: 98 % | BODY MASS INDEX: 26.63 KG/M2 | HEART RATE: 66 BPM | SYSTOLIC BLOOD PRESSURE: 125 MMHG

## 2020-10-26 DIAGNOSIS — M79.672 FOOT PAIN, BILATERAL: ICD-10-CM

## 2020-10-26 DIAGNOSIS — B07.0 PLANTAR WART OF LEFT FOOT: Primary | ICD-10-CM

## 2020-10-26 DIAGNOSIS — M20.21 HALLUX RIGIDUS OF RIGHT FOOT: ICD-10-CM

## 2020-10-26 DIAGNOSIS — M79.671 FOOT PAIN, BILATERAL: ICD-10-CM

## 2020-10-26 PROCEDURE — 99214 OFFICE O/P EST MOD 30 MIN: CPT | Performed by: NURSE PRACTITIONER

## 2020-10-26 PROCEDURE — 17110 DESTRUCTION B9 LES UP TO 14: CPT | Performed by: NURSE PRACTITIONER

## 2020-10-26 NOTE — PATIENT INSTRUCTIONS
Plantar Warts  Plantar warts are small growths on the bottom of the foot (sole). Warts are caused by a type of germ (virus). Most warts are not painful, and they usually do not cause problems. Sometimes, plantar warts can cause pain when you walk. Warts often go away on their own in time. They can also spread to other areas of the body. Treatments may be done if needed.  What are the causes?  · Plantar warts are caused by a germ that is called human papillomavirus (HPV).  ? Walking barefoot can cause exposure to the germ, especially if your feet are wet.  ? Warts happen when HPV attacks a break in the skin of the foot.  What increases the risk?  · Being between 10-20 years of age.  · Using public showers or locker rooms.  · Having a weakened body defense system (immune system).  What are the signs or symptoms?    · Flat or slightly raised growths that have a rough surface and look like a callus.  · Pain when you use your foot to support your body weight.  How is this treated?  In many cases, warts do not need treatment. Without treatment, they often go away with time. If treatment is needed or wanted, options may include:  · Applying medicated solutions, creams, or patches to the wart. These make the skin soft so that layers will slowly shed away.  · Freezing the wart with liquid nitrogen (cryotherapy).  · Burning the wart with:  ? Laser treatment.  ? An electrified probe (electrocautery).  · Injecting a medicine (Candida antigen) into the wart to help the body's defense system fight off the wart.  · Having surgery to remove the wart.  · Putting duct tape over the top of the wart (occlusion). You will leave the tape in place for as long as told by your doctor. Then you will replace it with a new strip of tape. This is done until the wart goes away.  Repeat treatment may be needed if you choose to remove warts. Warts sometimes go away and come back again.  Follow these instructions at home:  General  instructions  · Apply creams or solutions only as told by your doctor. Follow these steps if your doctor tells you to do so:  ? Soak your foot in warm water.  ? Remove the top layer of softened skin before you apply the medicine. You can use a pumice stone to remove the skin.  ? After you apply the medicine, put a bandage over the area of the wart.  ? Repeat the process every day or as told by your doctor.  · Do not scratch or pick at a wart.  · Wash your hands after you touch a wart.  · If a wart hurts, try covering it with a bandage that has a hole in the middle.  · Keep all follow-up visits as told by your doctor. This is important.  How is this prevented?    · Wear shoes and socks. Change your socks every day.  · Keep your feet clean and dry.  · Check your feet often.  · Do not walk barefoot in:  ? Shared locker rooms.  ? Shower areas.  ? Swimming pools.  · Avoid direct contact with warts on other people.  Contact a doctor if:  · Your warts do not improve after treatment.  · You have redness, swelling, or pain at the site of a wart.  · You have bleeding from a wart, and the bleeding does not stop when you put light pressure on the wart.  · You have diabetes and you get a wart.  Summary  · Warts are small growths on the skin.  · When warts happen on the bottom of the foot (sole), they are called plantar warts.  · In many cases, warts do not need treatment.  · Apply creams or solutions only as told by your doctor.  · Do not scratch or pick at a wart. Wash your hands after you touch a wart.  This information is not intended to replace advice given to you by your health care provider. Make sure you discuss any questions you have with your health care provider.  Document Released: 01/20/2012 Document Revised: 09/26/2019 Document Reviewed: 09/26/2019  Elsevier Patient Education © 2020 [employer]    Hallux Rigidus    Hallux rigidus is a type of joint pain or joint disease (arthritis) that affects your big toe  (hallux). This condition involves the joint that connects the base of your big toe to the main part of your foot (metatarsophalangeal joint or MTP joint).  This condition can cause your big toe to become stiff, painful, and difficult to move. Symptoms may get worse with movement or in cold or damp weather. The condition gets worse over time.  What are the causes?  This condition may be caused by having a foot that does not function the way that it should or that has an abnormal shape (structural deformity). These foot problems can run in families and may be passed down from parents to children (are hereditary). This condition can also be caused by:  · Injury.  · Overuse.  · Certain inflammatory diseases, including gout and rheumatoid arthritis.  What increases the risk?  You are more likely to develop this condition if you have:  · A foot bone (metatarsal) that is longer or higher than normal.  · A family history of hallux rigidus.  · Previously injured your big toe.  · Feet that do not have a curve (arch) on the inner side of the foot. This may be called flat feet or fallen arches.  · Ankles that turn in when you walk (pronation).  · Rheumatoid arthritis or gout.  · A job that requires you to stoop down often at work.  What are the signs or symptoms?  Symptoms of this condition include:  · Big toe pain.  · Stiffness and difficulty moving the big toe.  · Swelling of the toe and surrounding area.  · Bone spurs. These are bony growths that can form on the joint of the big toe.  · A limp.  How is this diagnosed?  This condition is diagnosed based on your medical history and a physical exam. You may also have X-rays.  How is this treated?  This condition is treated by:  · Wearing roomy, comfortable shoes that have a large toe box.  · Putting orthotic devices in your shoes.  · Taking pain medicines.  · Having physical therapy.  · Icing the injured area.  · Alternating between putting your foot in cold water and then in  warm water.  If your condition is severe, treatment may include:  · Corticosteroid injections to relieve pain.  · Surgery to remove bone spurs, fuse damaged bones together, or replace the entire joint.  Follow these instructions at home:  Managing pain, stiffness, and swelling    · Put your feet in cold water for 30 seconds, and then in warm water for 30 seconds. Alternate between the cold and warm water for 5 minutes. Do this several times a day or as told by your health care provider.  · If directed, put ice on the injured area.  ? Put ice in a plastic bag.  ? Place a towel between your skin and the bag.  ? Leave the ice on for 20 minutes, 2-3 times a day.  General instructions  · Take over-the-counter and prescription medicines only as told by your health care provider.  · Do not wear high heels or other restrictive footwear. Wear comfortable, supportive shoes that have a large toe box.  · Wear shoe inserts (orthotics) as told by your health care provider, if this applies.  · Do foot exercises as instructed by your health care provider or a physical therapist.  · Keep all follow-up visits as told by your health care provider. This is important.  Contact a health care provider if:  · You notice bone spurs or growths on or around your big toe.  · Your pain does not get better or it gets worse.  · You have pain while resting.  · You have pain in other parts of your body, such as your back, hip, or knee.  · You start to limp.  Summary  · Hallux rigidus is a condition that makes your big toe become stiff, painful, and difficult to move.  · It can be caused by injury, overuse, or inflammatory diseases.  · This condition may be treated with ice, medicines, physical therapy, and surgery.  · Do not wear high heels or other restrictive footwear. Wear comfortable, supportive shoes that have a large toe box.  This information is not intended to replace advice given to you by your health care provider. Make sure you discuss  any questions you have with your health care provider.  Document Released: 12/18/2006 Document Revised: 09/27/2019 Document Reviewed: 09/30/2019  Elsevier Patient Education © 2020 Elsevier Inc.

## 2020-10-26 NOTE — PROGRESS NOTES
[facility] - PODIATRY    Today's Date: 10/26/20    Patient Name: Miguelangel Avalos  MRN: [id number]  CSN: [id number]  PCP: Ronal Walker MD  Referring Provider: Yuliana Fu*    SUBJECTIVE     Chief Complaint   Patient presents with   • Establish Care     pt is here for plantar wart in the left foot - pt stated that he is having pain in right foot. pt believes that he has bone spurs in the right foot causing pain - pt pain level 5/10 -  pcp [facility]      HPI: Miguelangel Avalos, a 65 y.o.male, comes to clinic as a(n) new patient complaining of foot pain and complaining of plantar wart of left foot. Patient has no significant medical history. Patient presents today with complaints of painful lesion that is believed to be a plantar wart of the left foot. This has been present for several years. He relates that he has had no previous treatment for warts. Notes that the area has become painful with walking and notes that the last time he was at the beach he had significant pain while walking on the sand. Also relates that he had pain and enlargement of the joint of the great toe on the right foot. This has previously been evaluated many years ago (approximately 2012) and was told that he had arthritic changes in the joint and that he would likely need surgery if it became problematic. Notes that it does cause some pain but it is not daily. Relates pain with movement of the 1st MTPJ and pain depends upon activity level. Admits pain at 5/10 level and described as aching and sharp. Denies previous treatment. Denies any constitutional symptoms. No other pedal complaints at this time.    Past Medical History:   Diagnosis Date   • Allergic      Past Surgical History:   Procedure Laterality Date   • COLONOSCOPY  12/17/2009   • COLONOSCOPY N/A 1/27/2017    Procedure: COLONOSCOPY WITH ANESTHESIA;  Surgeon: Rajiv Lazcano MD;  Location: [facility] ENDOSCOPY;  Service:    • KNEE SURGERY       Family History    Problem Relation Age of Onset   • Colon polyps Mother    • No Known Problems Father    • No Known Problems Brother    • No Known Problems Daughter    • No Known Problems Daughter    • No Known Problems Daughter      Social History     Socioeconomic History   • Marital status:      Spouse name: Not on file   • Number of children: Not on file   • Years of education: Not on file   • Highest education level: Not on file   Tobacco Use   • Smoking status: Former Smoker   • Smokeless tobacco: Never Used   Substance and Sexual Activity   • Alcohol use: Yes     Comment: osscaionally   • Drug use: No   • Sexual activity: Defer     No Known Allergies  Current Outpatient Medications   Medication Sig Dispense Refill   • cetirizine (ZyrTEC Allergy) 10 MG tablet Zyrtec 10 mg tablet   Take 1 tablet every day by oral route.     • fluticasone (FLONASE) 50 MCG/ACT nasal spray INSTILL 2 SPRAYS INTO EACH NOSTRIL DAILY. 32 mL 0   • Salicylic Acid 40 % pads Apply 1 pad topically Daily. 14 each 2     No current facility-administered medications for this visit.      Review of Systems   Constitutional: Negative for chills and fever.   HENT: Negative for congestion.    Respiratory: Negative for shortness of breath.    Cardiovascular: Negative for chest pain and leg swelling.   Gastrointestinal: Negative for constipation, diarrhea, nausea and vomiting.   Musculoskeletal: Positive for arthralgias. Negative for myalgias.   Skin: Negative for wound.        Lesion on left plantar foot   Neurological: Negative for numbness.       OBJECTIVE     Vitals:    10/26/20 1455   BP: 125/65   Pulse: 66   SpO2: 98%       PHYSICAL EXAM  GEN:   Accompanied by spouse.     Foot/Ankle Exam:       General:   Appearance: appears stated age and healthy    Orientation: AAOx3    Affect: appropriate    Gait: unimpaired    Assistance: independent    Shoe Gear:  Casual shoes    VASCULAR      Right Foot Vascularity   Dorsalis pedis:  2+  Posterior tibial:   2+  Skin Temperature: warm    Edema Grading:  None  CFT:  3  Pedal Hair Growth:  Present  Varicosities: none       Left Foot Vascularity   Dorsalis pedis:  2+  Posterior tibial:  2+  Skin Temperature: warm    Edema Grading:  None  CFT:  3  Pedal Hair Growth:  Present  Varicosities: none        NEUROLOGIC     Right Foot Neurologic   Normal sensation    Light touch sensation:  Normal  Vibratory sensation:  Normal  Hot/Cold sensation: normal    Protective Sensation using [place] Monofilament:  10     Left Foot Neurologic   Normal sensation    Light touch sensation:  Normal  Vibratory sensation:  Normal  Hot/cold sensation: normal    Protective Sensation using [place] Monofilament:  10     MUSCULOSKELETAL      Right Foot Musculoskeletal   Ecchymosis:  None  Tenderness: great toe metatarsophalangeal joint    Arch:  Normal  Hallux valgus: No    Stiff great toe joint: hallux rigidus.       Left Foot Musculoskeletal   Ecchymosis:  None  Tenderness: none    Tenderness comment:  Lesion on plantar foot  Arch:  Normal  Hallux valgus: No    Hallux limitus: No       MUSCLE STRENGTH     Right Foot Muscle Strength   Foot dorsiflexion:  5  Foot plantar flexion:  5  Foot inversion:  5  Foot eversion:  5     Left Foot Muscle Strength   Foot dorsiflexion:  5  Foot plantar flexion:  5  Foot inversion:  5  Foot eversion:  5     RANGE OF MOTION      Right Foot Range of Motion   Foot and ankle ROM within normal limits       Left Foot Range of Motion   Foot and ankle ROM within normal limits       DERMATOLOGIC     Right Foot Dermatologic   Skin: skin intact    Nails: normal       Left Foot Dermatologic   Skin: warts    Nails: normal       Image:       RADIOLOGY/NUCLEAR:  No results found.    LABORATORY/CULTURE RESULTS:      PATHOLOGY RESULTS:       ASSESSMENT/PLAN     Diagnoses and all orders for this visit:    1. Plantar wart of left foot (Primary)  -     Salicylic Acid 40 % pads; Apply 1 pad topically Daily.  Dispense:  14 each; Refill: 2    2. Hallux rigidus of right foot    3. Foot pain, bilateral      Comprehensive lower extremity examination and evaluation was performed.  Discussed findings and treatment plan including risks, benefits, and treatment options with patient in detail. Patient agreed with treatment plan.  After written consent obtained, cryoablation of skin lesion(s) x1 performed as documented in procedure note  RX for Salicylic acid 40% pads to be used daily.    Advised to pare surrounding tissues daily to assist with contact of medication.   Patient to follow-up after return from vacation in [place], around 2 weeks.   Right MTPJ pain and swelling consistent with bony spur and arthritic changes. Recommend ongoing conservative care at this time as it is not causing daily problems or interference with daily life or activities. May consider x-ray and further discussion of correction if problems worsen.    An After Visit Summary was printed and given to the patient at discharge, including (if requested) any available informative/educational handouts regarding diagnosis, treatment, or medications. All questions were answered to patient/family satisfaction. Should symptoms fail to improve or worsen they agree to call or return to clinic or to go to the Emergency Department. Discussed the importance of following up with any needed screening tests/labs/specialist appointments and any requested follow-up recommended by me today. Importance of maintaining follow-up discussed and patient accepts that missed appointments can delay diagnosis and potentially lead to worsening of conditions.  Return in about 2 weeks (around 11/9/2020)., or sooner if acute issues arise.      Cryotherapy, Skin Lesion    Date/Time: 10/26/2020 3:40 PM  Performed by: Brad Saavedra APRN  Authorized by: Brad Saavedra APRN   Preparation: Patient was prepped and draped in the usual sterile fashion.  Local anesthesia used:  no    Anesthesia:  Local anesthesia used: no    Sedation:  Patient sedated: no    Patient tolerance: patient tolerated the procedure well with no immediate complications  Comments: 3x freeze, thaw cycles with 3 second freeze and 30 second thaw.           This document has been electronically signed by MELANIE Laureano on October 26, 2020 16:14 CDT

## 2020-12-03 ENCOUNTER — TELEPHONE (OUTPATIENT)
Dept: PODIATRY | Facility: CLINIC | Age: 65
End: 2020-12-03

## 2020-12-04 ENCOUNTER — OFFICE VISIT (OUTPATIENT)
Dept: PODIATRY | Facility: CLINIC | Age: 65
End: 2020-12-04

## 2020-12-04 VITALS
BODY MASS INDEX: 26.36 KG/M2 | HEART RATE: 53 BPM | WEIGHT: 178 LBS | SYSTOLIC BLOOD PRESSURE: 132 MMHG | OXYGEN SATURATION: 97 % | HEIGHT: 69 IN | DIASTOLIC BLOOD PRESSURE: 88 MMHG

## 2020-12-04 DIAGNOSIS — B07.0 PLANTAR WART OF LEFT FOOT: Primary | ICD-10-CM

## 2020-12-04 DIAGNOSIS — M79.672 FOOT PAIN, LEFT: ICD-10-CM

## 2020-12-04 PROCEDURE — 11306 SHAVE SKIN LESION 0.6-1.0 CM: CPT | Performed by: NURSE PRACTITIONER

## 2020-12-04 NOTE — PROGRESS NOTES
[facility] - PODIATRY    Today's Date: 12/04/20    Patient Name: Miguelangel Avalos  MRN: [id number]  CSN: [id number]  PCP: Ronal Walker MD  Referring Provider: No ref. provider found    SUBJECTIVE     Chief Complaint   Patient presents with   • Follow-up     2 wk f/u of plantar wart of the left foot.  Pt states that his pain level is a 1-2/10.  He has been using the medicated pads and had the cryo spray at the last visit.  He states that it does seem to help.  He needs a refill of the pads.     HPI: Miguelangel Avalos, a 65 y.o.male, comes to clinic as a(n) established patient for follow-up treatment of left foot plantar wart. Patient has no significant medical history. Patient presents for follow-up after cryotherapy and application of salicylic acid pads to plantar wart since previous visit. Notes that he missed last appointment due to COVID. Has continued using acid pads but notes that he was stretching them out over several days of use because he was unsure if he had refills. Notes that foot has been feeling better. Has not been paring the area at home. Admits pain at 1-2/10 level and described as aching. Relates previous treatment(s) including cryotherapy and salicyclic acid pads. Denies any constitutional symptoms. No other pedal complaints at this time.    Past Medical History:   Diagnosis Date   • Allergic      Past Surgical History:   Procedure Laterality Date   • COLONOSCOPY  12/17/2009   • COLONOSCOPY N/A 1/27/2017    Procedure: COLONOSCOPY WITH ANESTHESIA;  Surgeon: Rajiv Lazcano MD;  Location: [facility] ENDOSCOPY;  Service:    • KNEE SURGERY       Family History   Problem Relation Age of Onset   • Colon polyps Mother    • No Known Problems Father    • No Known Problems Brother    • No Known Problems Daughter    • No Known Problems Daughter    • No Known Problems Daughter      Social History     Socioeconomic History   • Marital status:      Spouse name: Not on file   • Number of  children: Not on file   • Years of education: Not on file   • Highest education level: Not on file   Tobacco Use   • Smoking status: Former Smoker   • Smokeless tobacco: Never Used   Substance and Sexual Activity   • Alcohol use: Yes     Comment: osscaionally   • Drug use: No   • Sexual activity: Defer     No Known Allergies  Current Outpatient Medications   Medication Sig Dispense Refill   • cetirizine (ZyrTEC Allergy) 10 MG tablet Zyrtec 10 mg tablet   Take 1 tablet every day by oral route.     • fluticasone (FLONASE) 50 MCG/ACT nasal spray INSTILL 2 SPRAYS INTO EACH NOSTRIL DAILY. 32 mL 0   • Salicylic Acid 40 % pads Apply 1 pad topically Daily for 14 days. 28 each 2     No current facility-administered medications for this visit.      Review of Systems   Constitutional: Negative for chills and fever.   HENT: Negative for congestion.    Respiratory: Negative for shortness of breath.    Cardiovascular: Negative for chest pain and leg swelling.   Gastrointestinal: Negative for constipation, diarrhea, nausea and vomiting.   Musculoskeletal: Positive for arthralgias. Negative for myalgias.   Skin: Negative for wound.        Lesion on left plantar foot   Neurological: Negative for numbness.       OBJECTIVE     Vitals:    12/04/20 1449   BP: 132/88   Pulse: 53   SpO2: 97%       PHYSICAL EXAM  GEN:   Accompanied by none.     Foot/Ankle Exam:       General:   Appearance: appears stated age and healthy    Orientation: AAOx3    Affect: appropriate    Gait: unimpaired    Assistance: independent    Shoe Gear:  Casual shoes    VASCULAR      Right Foot Vascularity   Dorsalis pedis:  2+  Posterior tibial:  2+  Skin Temperature: warm    Edema Grading:  None  CFT:  3  Pedal Hair Growth:  Present  Varicosities: none       Left Foot Vascularity   Dorsalis pedis:  2+  Posterior tibial:  2+  Skin Temperature: warm    Edema Grading:  None  CFT:  3  Pedal Hair Growth:  Present  Varicosities: none        NEUROLOGIC     Right Foot  Neurologic   Normal sensation    Light touch sensation:  Normal  Vibratory sensation:  Normal  Hot/Cold sensation: normal    Protective Sensation using [place] Monofilament:  10     Left Foot Neurologic   Normal sensation    Light touch sensation:  Normal  Vibratory sensation:  Normal  Hot/cold sensation: normal    Protective Sensation using [place] Monofilament:  10     MUSCULOSKELETAL      Right Foot Musculoskeletal   Ecchymosis:  None  Tenderness: great toe metatarsophalangeal joint    Arch:  Normal  Hallux valgus: No    Stiff great toe joint: hallux rigidus.       Left Foot Musculoskeletal   Ecchymosis:  None  Tenderness: none    Tenderness comment:  Lesion on plantar foot  Arch:  Normal  Hallux valgus: No    Hallux limitus: No       MUSCLE STRENGTH     Right Foot Muscle Strength   Foot dorsiflexion:  5  Foot plantar flexion:  5  Foot inversion:  5  Foot eversion:  5     Left Foot Muscle Strength   Foot dorsiflexion:  5  Foot plantar flexion:  5  Foot inversion:  5  Foot eversion:  5     RANGE OF MOTION      Right Foot Range of Motion   Foot and ankle ROM within normal limits       Left Foot Range of Motion   Foot and ankle ROM within normal limits       DERMATOLOGIC     Right Foot Dermatologic   Skin: skin intact    Nails: normal       Left Foot Dermatologic   Skin: warts    Nails: normal       Image:       RADIOLOGY/NUCLEAR:  No results found.    LABORATORY/CULTURE RESULTS:      PATHOLOGY RESULTS:       ASSESSMENT/PLAN     Diagnoses and all orders for this visit:    1. Foot pain, left (Primary)    2. Plantar wart of left foot  -     Salicylic Acid 40 % pads; Apply 1 pad topically Daily for 14 days.  Dispense: 28 each; Refill: 2      Comprehensive lower extremity examination and evaluation was performed.  Discussed findings and treatment plan including risks, benefits, and treatment options with patient in detail. Patient agreed with treatment plan.  After verbal consent obtained, shaving of  skin lesion(s) x1 performed without incidence.  Continue use of salicylic acid pads. New rx sent in. Encouraged to pare lesion.     An After Visit Summary was printed and given to the patient at discharge, including (if requested) any available informative/educational handouts regarding diagnosis, treatment, or medications. All questions were answered to patient/family satisfaction. Should symptoms fail to improve or worsen they agree to call or return to clinic or to go to the Emergency Department. Discussed the importance of following up with any needed screening tests/labs/specialist appointments and any requested follow-up recommended by me today. Importance of maintaining follow-up discussed and patient accepts that missed appointments can delay diagnosis and potentially lead to worsening of conditions.  Return in about 2 weeks (around 12/18/2020)., or sooner if acute issues arise.      Procedures    This document has been electronically signed by MELANIE Laureano on December 4, 2020 16:30 CST

## 2020-12-04 NOTE — PATIENT INSTRUCTIONS
Plantar Warts  Warts are small growths on the skin. When they occur on the underside (sole) of the foot, they are called plantar warts. Plantar warts often occur in groups, with several small warts around a larger wart. They tend to develop on the heel or the ball of the foot. They may grow into the deeper layers of skin or rise above the surface of the skin.  Most warts are not painful, and they usually do not cause problems. However, plantar warts may cause pain when you walk because pressure is applied to them. Plantar warts may spread to other areas of the sole. They can also spread to other areas of the body through direct and indirect contact. Warts often go away on their own in time. Various treatments may be done if needed or desired.  What are the causes?  Plantar warts are caused by a type of virus that is called human papillomavirus (HPV).  · Walking barefoot can cause exposure to the virus, especially if your feet are wet.  · HPV attacks a break in the skin of the foot.  What increases the risk?  You are more likely to develop this condition if you:  · Are between 10-20 years of age.  · Use public showers or locker rooms.  · Have a weakened body defense system (immune system).  What are the signs or symptoms?  Common symptoms of this condition include:  · Flat or slightly raised growths that have a rough surface and look similar to a callus.  · Pain when you use your foot to support your body weight.  How is this diagnosed?  A plantar wart can usually be diagnosed from its appearance. In some cases, a tissue sample may be removed (biopsy) to be looked at under a microscope.  How is this treated?  In many cases, warts do not need treatment. Without treatment, they often go away with time. If treatment is needed or desired, options may include:  · Applying medicated solutions, creams, or patches to the wart. These may be over-the-counter or prescription medicines that make the skin soft so that layers will  gradually shed away. In many cases, the medicine is applied one or two times a day and covered with a bandage.  · Freezing the wart with liquid nitrogen (cryotherapy).  · Burning the wart with:  ? Laser treatment.  ? An electrified probe (electrocautery).  · Injecting a medicine (Candida antigen) into the wart to help the body's immune system fight off the wart.  · Having surgery to remove the wart.  · Putting duct tape over the top of the wart (occlusion). You will leave the tape in place for as long as told by your health care provider, and then you will replace it with a new strip of tape. This is done until the wart goes away.  Repeat treatment may be needed if you choose to remove warts. Warts sometimes go away and come back again.  Follow these instructions at home:  · Apply medicated creams or solutions only as told by your health care provider. This may involve:  ? Soaking the affected area in warm water.  ? Removing the top layer of softened skin before you apply the medicine. A pumice stone works well for removing the skin.  ? Applying a bandage over the affected area after you apply the medicine.  ? Repeating the process daily or as told by your health care provider.  · Do not scratch or pick at a wart.  · Wash your hands after you touch a wart.  · If a wart is painful, try covering it with a bandage that has a hole in the middle. This helps to take pressure off the wart.  · Keep all follow-up visits as told by your health care provider. This is important.  How is this prevented?  Take these actions to help prevent warts:  · Wear shoes and socks. Change your socks daily.  · Keep your feet clean and dry.  · Do not walk barefoot in shared locker rooms, shower areas, or swimming pools.  · Check your feet regularly.  · Avoid direct contact with warts on other people.  Contact a health care provider if:  · Your warts do not improve after treatment.  · You have redness, swelling, or pain at the site of a  wart.  · You have bleeding from a wart that does not stop with light pressure.  · You have diabetes and you develop a wart.  Summary  · Warts are small growths on the skin. When they occur on the underside (sole) of the foot, they are called plantar warts.  · In many cases, warts do not need treatment. Without treatment, they often go away with time.  · Apply medicated creams or solutions only as told by your health care provider.  · Do not scratch or pick at a wart. Wash your hands after you touch a wart.  · Keep all follow-up visits as told by your health care provider. This is important.  This information is not intended to replace advice given to you by your health care provider. Make sure you discuss any questions you have with your health care provider.  Document Revised: 07/16/2019 Document Reviewed: 07/16/2019  [employer] Patient Education © 2020 [employer]

## 2020-12-17 NOTE — PROGRESS NOTES
[facility] - PODIATRY    Today's Date: 12/21/20    Patient Name: Miguelangel Avalos  MRN: [id number]  CSN: [id number]  PCP: Ronal Walker MD  Referring Provider: No ref. provider found    SUBJECTIVE     Chief Complaint   Patient presents with   • Follow-up     pt is here today for a 2wk f/u on plantar wart on the left foot; pt states foot is doing a lot better; Pt's pain score today is 0/10; PCP last seen in 01/2017;      HPI: Miguelangel Avalos, a 65 y.o.male, comes to clinic as a(n) established patient for follow-up treatment of left foot plantar wart. Patient has no significant medical history. Patient reports that since previous visit he has continued to apply topical pads daily and has been paring lesion occasionally.  States that pain has significantly improved with no pain today. Denies pain. Relates previous treatment(s) including cryotherapy, shaving of lesion, and salicyclic acid pads. Denies any constitutional symptoms. No other pedal complaints at this time.    Past Medical History:   Diagnosis Date   • Allergic      Past Surgical History:   Procedure Laterality Date   • COLONOSCOPY  12/17/2009   • COLONOSCOPY N/A 1/27/2017    Procedure: COLONOSCOPY WITH ANESTHESIA;  Surgeon: Rajiv Lazcano MD;  Location: [facility] ENDOSCOPY;  Service:    • KNEE SURGERY       Family History   Problem Relation Age of Onset   • Colon polyps Mother    • No Known Problems Father    • No Known Problems Brother    • No Known Problems Daughter    • No Known Problems Daughter    • No Known Problems Daughter      Social History     Socioeconomic History   • Marital status:      Spouse name: Not on file   • Number of children: Not on file   • Years of education: Not on file   • Highest education level: Not on file   Tobacco Use   • Smoking status: Former Smoker   • Smokeless tobacco: Never Used   Substance and Sexual Activity   • Alcohol use: Yes     Frequency: Monthly or less     Comment: osscaionally   •  Drug use: No   • Sexual activity: Defer     No Known Allergies  Current Outpatient Medications   Medication Sig Dispense Refill   • cetirizine (ZyrTEC Allergy) 10 MG tablet Zyrtec 10 mg tablet   Take 1 tablet every day by oral route.     • fluticasone (FLONASE) 50 MCG/ACT nasal spray INSTILL 2 SPRAYS INTO EACH NOSTRIL DAILY. 32 mL 0     No current facility-administered medications for this visit.      Review of Systems   Constitutional: Negative for chills and fever.   HENT: Negative for congestion.    Respiratory: Negative for shortness of breath.    Cardiovascular: Negative for chest pain and leg swelling.   Gastrointestinal: Negative for constipation, diarrhea, nausea and vomiting.   Musculoskeletal: Positive for arthralgias. Negative for myalgias.   Skin: Negative for wound.        Lesion on left plantar foot   Neurological: Negative for numbness.       OBJECTIVE     Vitals:    12/21/20 1418   BP: 126/78   Pulse: 50   SpO2: 97%       PHYSICAL EXAM  GEN:   Accompanied by none.     Foot/Ankle Exam:       General:   Appearance: appears stated age and healthy    Orientation: AAOx3    Affect: appropriate    Gait: unimpaired    Assistance: independent    Shoe Gear:  Casual shoes    VASCULAR      Right Foot Vascularity   Dorsalis pedis:  2+  Posterior tibial:  2+  Skin Temperature: warm    Edema Grading:  None  CFT:  3  Pedal Hair Growth:  Present  Varicosities: none       Left Foot Vascularity   Dorsalis pedis:  2+  Posterior tibial:  2+  Skin Temperature: warm    Edema Grading:  None  CFT:  3  Pedal Hair Growth:  Present  Varicosities: none        NEUROLOGIC     Right Foot Neurologic   Normal sensation    Light touch sensation:  Normal  Vibratory sensation:  Normal  Hot/Cold sensation: normal    Protective Sensation using [place] Monofilament:  10     Left Foot Neurologic   Normal sensation    Light touch sensation:  Normal  Vibratory sensation:  Normal  Hot/cold sensation: normal    Protective Sensation  using [place] Monofilament:  10     MUSCULOSKELETAL      Right Foot Musculoskeletal   Ecchymosis:  None  Tenderness: none    Arch:  Normal  Hallux valgus: No    Stiff great toe joint: hallux rigidus.       Left Foot Musculoskeletal   Ecchymosis:  None  Tenderness: none    Tenderness comment:  Lesion on plantar foot  Arch:  Normal  Hallux valgus: No    Hallux limitus: No       MUSCLE STRENGTH     Right Foot Muscle Strength   Foot dorsiflexion:  5  Foot plantar flexion:  5  Foot inversion:  5  Foot eversion:  5     Left Foot Muscle Strength   Foot dorsiflexion:  5  Foot plantar flexion:  5  Foot inversion:  5  Foot eversion:  5     RANGE OF MOTION      Right Foot Range of Motion   Foot and ankle ROM within normal limits       Left Foot Range of Motion   Foot and ankle ROM within normal limits       DERMATOLOGIC     Right Foot Dermatologic   Skin: skin intact    Nails: normal       Left Foot Dermatologic   Skin: warts    Nails: normal       Image:       RADIOLOGY/NUCLEAR:  No results found.    LABORATORY/CULTURE RESULTS:      PATHOLOGY RESULTS:       ASSESSMENT/PLAN     Diagnoses and all orders for this visit:    1. Plantar wart of left foot (Primary)    2. Foot pain, left      Comprehensive lower extremity examination and evaluation was performed.  Discussed findings and treatment plan including risks, benefits, and treatment options with patient in detail. Patient agreed with treatment plan.  After verbal consent obtained, shaving of skin lesion(s) x1 performed without incidence.  Continue use of salicylic acid pads.  Continue to pare lesion.    Additional 2-week course of salicylic acid topical pad will likely result in resolution of lesion.  May follow back up on as-needed basis if symptoms return/worsen or fail to completely resolve.  An After Visit Summary was printed and given to the patient at discharge, including (if requested) any available informative/educational handouts regarding diagnosis,  treatment, or medications. All questions were answered to patient/family satisfaction. Should symptoms fail to improve or worsen they agree to call or return to clinic or to go to the Emergency Department. Discussed the importance of following up with any needed screening tests/labs/specialist appointments and any requested follow-up recommended by me today. Importance of maintaining follow-up discussed and patient accepts that missed appointments can delay diagnosis and potentially lead to worsening of conditions.  Return if symptoms worsen or fail to improve., or sooner if acute issues arise.        This document has been electronically signed by MELANIE Laureano on December 21, 2020 15:04 CST

## 2020-12-21 ENCOUNTER — OFFICE VISIT (OUTPATIENT)
Dept: PODIATRY | Facility: CLINIC | Age: 65
End: 2020-12-21

## 2020-12-21 VITALS
HEIGHT: 69 IN | WEIGHT: 180.4 LBS | DIASTOLIC BLOOD PRESSURE: 78 MMHG | OXYGEN SATURATION: 97 % | BODY MASS INDEX: 26.72 KG/M2 | HEART RATE: 50 BPM | SYSTOLIC BLOOD PRESSURE: 126 MMHG

## 2020-12-21 DIAGNOSIS — B07.0 PLANTAR WART OF LEFT FOOT: Primary | ICD-10-CM

## 2020-12-21 DIAGNOSIS — M79.672 FOOT PAIN, LEFT: ICD-10-CM

## 2020-12-21 PROCEDURE — 11305 SHAVE SKIN LESION 0.5 CM/<: CPT | Performed by: NURSE PRACTITIONER

## 2022-01-03 ENCOUNTER — TELEPHONE (OUTPATIENT)
Dept: INTERNAL MEDICINE | Facility: CLINIC | Age: 67
End: 2022-01-03

## 2022-01-03 NOTE — TELEPHONE ENCOUNTER
Caller: Deborah Avalos    Relationship: Self    Best call back number:[phone]    What orders are you requesting (i.e. lab or imaging): COVID TEST     In what timeframe would the patient need to come in: ASAP    Where will you receive your lab/imaging services: OFFICE     Additional notes: NEEDING A COVID TEST BEFORE LEAVING THE COUNTRY

## 2022-01-10 ENCOUNTER — CLINICAL SUPPORT (OUTPATIENT)
Dept: INTERNAL MEDICINE | Facility: CLINIC | Age: 67
End: 2022-01-10

## 2022-01-10 DIAGNOSIS — Z11.52 ENCOUNTER FOR SCREENING FOR COVID-19: Primary | ICD-10-CM

## 2022-01-10 LAB — SARS-COV-2 ORF1AB RESP QL NAA+PROBE: NOT DETECTED

## 2022-01-10 PROCEDURE — U0004 COV-19 TEST NON-CDC HGH THRU: HCPCS | Performed by: NURSE PRACTITIONER

## 2022-01-10 PROCEDURE — 99211 OFF/OP EST MAY X REQ PHY/QHP: CPT

## 2022-01-10 NOTE — PROGRESS NOTES
Miguelangel Avalos  1955  [id number]    Verified patient's NAME and  before test was performed.     COVID-19 Test Performed:   Nasopharyngeal Swab     Location:  PATIENT VEHICLE     How did the patient tolerate the test?   Well tolerated by patient..    Staff Member Performing Test:  Jacki Mijares MA    PPE Worn:    mask, gloves, eye protection, face shield, gown and respirator

## 2022-03-25 ENCOUNTER — TELEPHONE (OUTPATIENT)
Dept: INTERNAL MEDICINE | Facility: CLINIC | Age: 67
End: 2022-03-25

## 2022-03-25 DIAGNOSIS — Z79.899 ENCOUNTER FOR LONG-TERM (CURRENT) USE OF OTHER MEDICATIONS: ICD-10-CM

## 2022-03-25 DIAGNOSIS — R53.83 OTHER FATIGUE: ICD-10-CM

## 2022-03-25 DIAGNOSIS — E55.9 VITAMIN D DEFICIENCY, UNSPECIFIED: ICD-10-CM

## 2022-03-25 DIAGNOSIS — Z12.5 SCREENING FOR PROSTATE CANCER: ICD-10-CM

## 2022-03-25 DIAGNOSIS — E53.9 VITAMIN B DEFICIENCY, UNSPECIFIED: ICD-10-CM

## 2022-03-25 DIAGNOSIS — Z13.220 SCREENING FOR LIPID DISORDERS: Primary | ICD-10-CM

## 2022-03-25 NOTE — TELEPHONE ENCOUNTER
Caller: Miguelangel Avalos    Relationship to patient: Self    Best call back number: [phone]      Patient is needing:  Pt is asking for labs to be ordered prior to appt on 4/22. He would like to come in for labs on 4/20 around 8am.    He said this is possible, just send him a SmartMenuCardt message to confirm.

## 2022-04-20 ENCOUNTER — CLINICAL SUPPORT (OUTPATIENT)
Dept: INTERNAL MEDICINE | Facility: CLINIC | Age: 67
End: 2022-04-20

## 2022-04-20 DIAGNOSIS — E55.9 VITAMIN D DEFICIENCY, UNSPECIFIED: ICD-10-CM

## 2022-04-20 DIAGNOSIS — Z12.5 SCREENING FOR PROSTATE CANCER: ICD-10-CM

## 2022-04-20 DIAGNOSIS — E53.9 VITAMIN B DEFICIENCY, UNSPECIFIED: ICD-10-CM

## 2022-04-20 DIAGNOSIS — R53.83 OTHER FATIGUE: ICD-10-CM

## 2022-04-20 DIAGNOSIS — Z79.899 ENCOUNTER FOR LONG-TERM (CURRENT) USE OF OTHER MEDICATIONS: ICD-10-CM

## 2022-04-20 DIAGNOSIS — Z13.220 SCREENING FOR LIPID DISORDERS: ICD-10-CM

## 2022-04-20 PROCEDURE — 36415 COLL VENOUS BLD VENIPUNCTURE: CPT | Performed by: INTERNAL MEDICINE

## 2022-04-20 NOTE — PROGRESS NOTES
Venipuncture Blood Specimen Collection  Venipuncture performed in left arm by Katherine Garcia CMA with good hemostasis. Patient tolerated the procedure well without complications.   04/20/22   Katherine Garcia CMA  Answers for HPI/ROS submitted by the patient on 4/15/2022  What is the primary reason for your visit?: Physical

## 2022-04-21 LAB
25(OH)D3+25(OH)D2 SERPL-MCNC: 41.2 NG/ML (ref 30–100)
ALBUMIN SERPL-MCNC: 4.4 G/DL (ref 3.8–4.8)
ALBUMIN/GLOB SERPL: 2.1 {RATIO} (ref 1.2–2.2)
ALP SERPL-CCNC: 58 IU/L (ref 44–121)
ALT SERPL-CCNC: 18 IU/L (ref 0–44)
AST SERPL-CCNC: 15 IU/L (ref 0–40)
BASOPHILS # BLD AUTO: 0 X10E3/UL (ref 0–0.2)
BASOPHILS NFR BLD AUTO: 1 %
BILIRUB SERPL-MCNC: 1.6 MG/DL (ref 0–1.2)
BUN SERPL-MCNC: 11 MG/DL (ref 8–27)
BUN/CREAT SERPL: 10 (ref 10–24)
CALCIUM SERPL-MCNC: 8.9 MG/DL (ref 8.6–10.2)
CHLORIDE SERPL-SCNC: 102 MMOL/L (ref 96–106)
CHOLEST SERPL-MCNC: 181 MG/DL (ref 100–199)
CO2 SERPL-SCNC: 24 MMOL/L (ref 20–29)
CREAT SERPL-MCNC: 1.14 MG/DL (ref 0.76–1.27)
EGFRCR SERPLBLD CKD-EPI 2021: 71 ML/MIN/1.73
EOSINOPHIL # BLD AUTO: 0.1 X10E3/UL (ref 0–0.4)
EOSINOPHIL NFR BLD AUTO: 3 %
ERYTHROCYTE [DISTWIDTH] IN BLOOD BY AUTOMATED COUNT: 12.3 % (ref 11.6–15.4)
GLOBULIN SER CALC-MCNC: 2.1 G/DL (ref 1.5–4.5)
GLUCOSE SERPL-MCNC: 93 MG/DL (ref 65–99)
HCT VFR BLD AUTO: 43.8 % (ref 37.5–51)
HDLC SERPL-MCNC: 78 MG/DL
HGB BLD-MCNC: 14.3 G/DL (ref 13–17.7)
IMM GRANULOCYTES # BLD AUTO: 0 X10E3/UL (ref 0–0.1)
IMM GRANULOCYTES NFR BLD AUTO: 0 %
LDLC SERPL CALC-MCNC: 95 MG/DL (ref 0–99)
LYMPHOCYTES # BLD AUTO: 1.6 X10E3/UL (ref 0.7–3.1)
LYMPHOCYTES NFR BLD AUTO: 38 %
MCH RBC QN AUTO: 31.1 PG (ref 26.6–33)
MCHC RBC AUTO-ENTMCNC: 32.6 G/DL (ref 31.5–35.7)
MCV RBC AUTO: 95 FL (ref 79–97)
MONOCYTES # BLD AUTO: 0.4 X10E3/UL (ref 0.1–0.9)
MONOCYTES NFR BLD AUTO: 9 %
NEUTROPHILS # BLD AUTO: 2.2 X10E3/UL (ref 1.4–7)
NEUTROPHILS NFR BLD AUTO: 49 %
PLATELET # BLD AUTO: 242 X10E3/UL (ref 150–450)
POTASSIUM SERPL-SCNC: 4.7 MMOL/L (ref 3.5–5.2)
PROT SERPL-MCNC: 6.5 G/DL (ref 6–8.5)
PSA SERPL-MCNC: 3.3 NG/ML (ref 0–4)
RBC # BLD AUTO: 4.6 X10E6/UL (ref 4.14–5.8)
SODIUM SERPL-SCNC: 140 MMOL/L (ref 134–144)
TRIGL SERPL-MCNC: 35 MG/DL (ref 0–149)
TSH SERPL DL<=0.005 MIU/L-ACNC: 2.11 UIU/ML (ref 0.45–4.5)
VIT B12 SERPL-MCNC: 323 PG/ML (ref 232–1245)
VLDLC SERPL CALC-MCNC: 8 MG/DL (ref 5–40)
WBC # BLD AUTO: 4.3 X10E3/UL (ref 3.4–10.8)

## 2022-04-22 ENCOUNTER — OFFICE VISIT (OUTPATIENT)
Dept: INTERNAL MEDICINE | Facility: CLINIC | Age: 67
End: 2022-04-22

## 2022-04-22 VITALS
RESPIRATION RATE: 20 BRPM | HEART RATE: 64 BPM | OXYGEN SATURATION: 97 % | TEMPERATURE: 97.2 F | DIASTOLIC BLOOD PRESSURE: 85 MMHG | SYSTOLIC BLOOD PRESSURE: 132 MMHG | HEIGHT: 69 IN | BODY MASS INDEX: 25.48 KG/M2 | WEIGHT: 172 LBS

## 2022-04-22 DIAGNOSIS — R97.20 RISING PSA LEVEL: ICD-10-CM

## 2022-04-22 DIAGNOSIS — Z00.00 ANNUAL PHYSICAL EXAM: Primary | ICD-10-CM

## 2022-04-22 PROCEDURE — 99397 PER PM REEVAL EST PAT 65+ YR: CPT | Performed by: NURSE PRACTITIONER

## 2022-04-22 NOTE — PROGRESS NOTES
Subjective     Chief Complaint   Patient presents with   • Annual Exam       History of Present Illness  Pt comes in today for his annual exam. He overall is doing well. His PSA has been steadily increasing. He is asymptomatic. He exercises regularly and drinks at least 100 ounces of water daily.     Review of Systems   Constitutional: Negative for fatigue.   HENT: Negative for ear pain, mouth sores and sinus pain.    Eyes: Positive for visual disturbance ( wears glasses).   Respiratory: Negative for cough, shortness of breath and wheezing.    Cardiovascular: Negative for chest pain, palpitations and leg swelling.   Gastrointestinal: Negative for blood in stool, constipation and diarrhea.   Endocrine: Negative for polydipsia, polyphagia and polyuria.   Genitourinary: Negative for flank pain and testicular pain.        Gets up at night to void   Musculoskeletal: Negative for back pain and myalgias.   Skin: Negative.    Allergic/Immunologic: Negative for immunocompromised state.   Neurological: Negative for seizures and headaches.   Hematological: Does not bruise/bleed easily.   Psychiatric/Behavioral: Negative for confusion. The patient is not nervous/anxious and is not hyperactive.       Past Medical History:   Past Medical History:   Diagnosis Date   • Allergic      Past Surgical History:  Past Surgical History:   Procedure Laterality Date   • COLONOSCOPY  12/17/2009   • COLONOSCOPY N/A 1/27/2017    Procedure: COLONOSCOPY WITH ANESTHESIA;  Surgeon: Rajiv Lazcano MD;  Location: [facility] ENDOSCOPY;  Service:    • KNEE SURGERY       Social History:  reports that he quit smoking about 38 years ago. He started smoking about 52 years ago. He quit after 5.00 years of use. He has never used smokeless tobacco. He reports current alcohol use of about 12.0 standard drinks of alcohol per week. He reports that he does not use drugs.    Family History: family history includes Cancer in his mother; Colon polyps in his  "mother; No Known Problems in his brother, daughter, daughter, daughter, and father.      Allergies:  No Known Allergies  Medications:  Prior to Admission medications    Medication Sig Start Date End Date Taking? Authorizing Provider   cetirizine (zyrTEC) 10 MG tablet Zyrtec 10 mg tablet   Take 1 tablet every day by oral route.   Yes Provider, MD Brisa   fluticasone (FLONASE) 50 MCG/ACT nasal spray INSTILL 2 SPRAYS INTO EACH NOSTRIL DAILY. 10/1/18   Paolo Hurst APRN       Objective     Vital Signs: /85 (BP Location: Left arm, Patient Position: Sitting, Cuff Size: Adult)   Pulse 64   Temp 97.2 °F (36.2 °C) (Temporal)   Resp 20   Ht 175.3 cm (69\")   Wt 78 kg (172 lb)   SpO2 97%   BMI 25.40 kg/m²   Physical Exam  Vitals reviewed.   Constitutional:       Appearance: He is well-developed.   HENT:      Head: Normocephalic and atraumatic.   Eyes:      Pupils: Pupils are equal, round, and reactive to light.   Neck:      Vascular: No JVD.   Cardiovascular:      Rate and Rhythm: Normal rate and regular rhythm.   Pulmonary:      Effort: Pulmonary effort is normal.   Abdominal:      General: Bowel sounds are normal.      Palpations: Abdomen is soft.   Musculoskeletal:         General: No deformity.      Cervical back: Normal range of motion and neck supple.   Lymphadenopathy:      Cervical: No cervical adenopathy.   Skin:     General: Skin is warm and dry.   Neurological:      Mental Status: He is alert and oriented to person, place, and time.   Psychiatric:         Behavior: Behavior normal.         Thought Content: Thought content normal.         Judgment: Judgment normal.         Patient's Body mass index is 25.4 kg/m². indicating that he is within normal range (BMI 18.5-24.9). No BMI management plan needed..      Results Reviewed:  Glucose   Date Value Ref Range Status   04/20/2022 93 65 - 99 mg/dL Final     BUN   Date Value Ref Range Status   04/20/2022 11 8 - 27 mg/dL Final     Creatinine   Date Value " Ref Range Status   04/20/2022 1.14 0.76 - 1.27 mg/dL Final     Sodium   Date Value Ref Range Status   04/20/2022 140 134 - 144 mmol/L Final     Potassium   Date Value Ref Range Status   04/20/2022 4.7 3.5 - 5.2 mmol/L Final     Chloride   Date Value Ref Range Status   04/20/2022 102 96 - 106 mmol/L Final     Total CO2   Date Value Ref Range Status   04/20/2022 24 20 - 29 mmol/L Final     Calcium   Date Value Ref Range Status   04/20/2022 8.9 8.6 - 10.2 mg/dL Final     ALT (SGPT)   Date Value Ref Range Status   04/20/2022 18 0 - 44 IU/L Final     AST (SGOT)   Date Value Ref Range Status   04/20/2022 15 0 - 40 IU/L Final     WBC   Date Value Ref Range Status   04/20/2022 4.3 3.4 - 10.8 x10E3/uL Final     Hematocrit   Date Value Ref Range Status   04/20/2022 43.8 37.5 - 51.0 % Final     Platelets   Date Value Ref Range Status   04/20/2022 242 150 - 450 x10E3/uL Final     Triglycerides   Date Value Ref Range Status   04/20/2022 35 0 - 149 mg/dL Final     HDL Cholesterol   Date Value Ref Range Status   04/20/2022 78 >39 mg/dL Final     LDL Chol Calc (NIH)   Date Value Ref Range Status   04/20/2022 95 0 - 99 mg/dL Final         Assessment / Plan     Assessment/Plan:  Diagnoses and all orders for this visit:    1. Annual physical exam (Primary)    2. Rising PSA level  -     PSA DIAGNOSTIC ONLY; Future      Return in about 1 year (around 4/22/2023) for Annual physical. unless patient needs to be seen sooner or acute issues arise.    Code Status: Full.     I have discussed the patient results/orders and and plan/recommendation with them at today's visit.      Yluiana Fu, MELANIE   04/22/2022

## 2022-10-28 ENCOUNTER — CLINICAL SUPPORT (OUTPATIENT)
Dept: INTERNAL MEDICINE | Facility: CLINIC | Age: 67
End: 2022-10-28

## 2022-10-28 DIAGNOSIS — R97.20 RISING PSA LEVEL: Primary | ICD-10-CM

## 2022-10-28 PROCEDURE — 36415 COLL VENOUS BLD VENIPUNCTURE: CPT | Performed by: NURSE PRACTITIONER

## 2022-10-28 NOTE — PROGRESS NOTES
Venipuncture Blood Specimen Collection  Venipuncture performed in left ac by Jacki Mijares MA with good hemostasis. Patient tolerated the procedure well without complications.   10/28/22   Jacki Mijares MA

## 2022-10-29 LAB — PSA SERPL-MCNC: 3.5 NG/ML (ref 0–4)

## 2022-10-31 NOTE — PROGRESS NOTES
Called pt with results of labs. Patient voiced understanding. Patient will do lab at next office visit.

## 2023-05-11 ENCOUNTER — OFFICE VISIT (OUTPATIENT)
Dept: INTERNAL MEDICINE | Facility: CLINIC | Age: 68
End: 2023-05-11
Payer: MEDICARE

## 2023-05-11 VITALS
SYSTOLIC BLOOD PRESSURE: 131 MMHG | WEIGHT: 170 LBS | TEMPERATURE: 98.4 F | BODY MASS INDEX: 25.18 KG/M2 | RESPIRATION RATE: 18 BRPM | DIASTOLIC BLOOD PRESSURE: 78 MMHG | HEIGHT: 69 IN | OXYGEN SATURATION: 97 % | HEART RATE: 55 BPM

## 2023-05-11 DIAGNOSIS — Z13.220 SCREENING FOR HYPERLIPIDEMIA: ICD-10-CM

## 2023-05-11 DIAGNOSIS — Z23 ENCOUNTER FOR IMMUNIZATION: ICD-10-CM

## 2023-05-11 DIAGNOSIS — Z00.00 ENCOUNTER FOR SUBSEQUENT ANNUAL WELLNESS VISIT IN MEDICARE PATIENT: Primary | ICD-10-CM

## 2023-05-11 DIAGNOSIS — Z12.5 SCREENING FOR PROSTATE CANCER: ICD-10-CM

## 2023-05-11 DIAGNOSIS — Z13.6 SCREENING FOR AAA (ABDOMINAL AORTIC ANEURYSM): ICD-10-CM

## 2023-05-11 PROCEDURE — 1159F MED LIST DOCD IN RCRD: CPT | Performed by: NURSE PRACTITIONER

## 2023-05-11 PROCEDURE — G0439 PPPS, SUBSEQ VISIT: HCPCS | Performed by: NURSE PRACTITIONER

## 2023-05-11 PROCEDURE — 90471 IMMUNIZATION ADMIN: CPT | Performed by: NURSE PRACTITIONER

## 2023-05-11 PROCEDURE — 1160F RVW MEDS BY RX/DR IN RCRD: CPT | Performed by: NURSE PRACTITIONER

## 2023-05-11 PROCEDURE — 90677 PCV20 VACCINE IM: CPT | Performed by: NURSE PRACTITIONER

## 2023-05-11 PROCEDURE — 1170F FXNL STATUS ASSESSED: CPT | Performed by: NURSE PRACTITIONER

## 2023-05-11 PROCEDURE — 90715 TDAP VACCINE 7 YRS/> IM: CPT | Performed by: NURSE PRACTITIONER

## 2023-05-11 NOTE — PROGRESS NOTES
The ABCs of the Annual Wellness Visit  Subsequent Medicare Wellness Visit    Answers for HPI/ROS submitted by the patient on 4/26/2023  What is the primary reason for your visit?: medicare wellness      Subjective      Miguelangel Avalos is a 68 y.o. male who presents for a Subsequent Medicare Wellness Visit.    The following portions of the patient's history were reviewed and   updated as appropriate: allergies, current medications, past family history, past medical history, past social history, past surgical history and problem list.    Compared to one year ago, the patient feels his physical   health is the same.    Compared to one year ago, the patient feels his mental   health is better.    Recent Hospitalizations:  He was not admitted to the hospital during the last year.       Current Medical Providers:  Patient Care Team:  Yuliana Fu APRN as PCP - General (Nurse Practitioner)  Abdelrahman Montaño MD as Consulting Physician (Urology)    Outpatient Medications Prior to Visit   Medication Sig Dispense Refill   • cetirizine (zyrTEC) 10 MG tablet Zyrtec 10 mg tablet   Take 1 tablet every day by oral route.     • fluticasone (FLONASE) 50 MCG/ACT nasal spray INSTILL 2 SPRAYS INTO EACH NOSTRIL DAILY. 32 mL 0     No facility-administered medications prior to visit.       No opioid medication identified on active medication list. I have reviewed chart for other potential  high risk medication/s and harmful drug interactions in the elderly.          The 10-year ASCVD risk score (Lilian PATTEN, et al., 2019) is: 11.4%    Values used to calculate the score:      Age: 68 years      Sex: Male      Is Non- : No      Diabetic: No      Tobacco smoker: No      Systolic Blood Pressure: 131 mmHg      Is BP treated: No      HDL Cholesterol: 78 mg/dL      Total Cholesterol: 181 mg/dL    Aspirin is not on active medication list.  patient has tried this in the past with increased bruising and bleeding, did  "not tolerate this so stopped..    Patient Active Problem List   Diagnosis   • Overweight (BMI 25.0-29.9)     Advance Care Planning   Advance Care Planning     Advance Directive is not on file.  ACP discussion was held with the patient during this visit. Patient does not have an advance directive, declines further assistance.     Objective    Vitals:    23 0914   BP: 131/78   Pulse: 55   Resp: 18   Temp: 98.4 °F (36.9 °C)   SpO2: 97%   Weight: 77.1 kg (170 lb)   Height: 175.3 cm (69\")     Estimated body mass index is 25.1 kg/m² as calculated from the following:    Height as of this encounter: 175.3 cm (69\").    Weight as of this encounter: 77.1 kg (170 lb).    BMI is >= 25 and <30. (Overweight) The following options were offered after discussion;: none (medical contraindication)      Does the patient have evidence of cognitive impairment?   No            HEALTH RISK ASSESSMENT    Smoking Status:  Social History     Tobacco Use   Smoking Status Former   • Packs/day: 0.50   • Years: 5.00   • Pack years: 2.50   • Types: Cigarettes   • Start date: 1970   • Quit date: 1984   • Years since quittin.3   • Passive exposure: Never   Smokeless Tobacco Never     Alcohol Consumption:  Social History     Substance and Sexual Activity   Alcohol Use Yes   • Alcohol/week: 12.0 standard drinks   • Types: 3 Glasses of wine, 4 Cans of beer, 5 Shots of liquor per week    Comment: osscaionally     Fall Risk Screen:    JANICE Fall Risk Assessment was completed, and patient is at LOW risk for falls.Assessment completed on:2023    Depression Screenin/11/2023     9:14 AM   PHQ-2/PHQ-9 Depression Screening   Little Interest or Pleasure in Doing Things 0-->not at all   Feeling Down, Depressed or Hopeless 0-->not at all   PHQ-9: Brief Depression Severity Measure Score 0       Health Habits and Functional and Cognitive Screenin/11/2023     9:25 AM   Functional & Cognitive Status   Do you have difficulty " preparing food and eating? No   Do you have difficulty bathing yourself, getting dressed or grooming yourself? No   Do you have difficulty using the toilet? No   Do you have difficulty moving around from place to place? No   Do you have trouble with steps or getting out of a bed or a chair? No   Current Diet Well Balanced Diet   Dental Exam Up to date   Eye Exam Up to date   Exercise (times per week) 3 times per week   Current Exercises Include Walking;Yard Work   Do you need help using the phone?  No   Are you deaf or do you have serious difficulty hearing?  No   Do you need help with transportation? No   Do you need help shopping? No   Do you need help preparing meals?  No   Do you need help with housework?  No   Do you need help with laundry? No   Do you need help taking your medications? No   Do you need help managing money? No   Do you ever drive or ride in a car without wearing a seat belt? No   Have you felt unusual stress, anger or loneliness in the last month? No   Who do you live with? Spouse   If you need help, do you have trouble finding someone available to you? No   Have you been bothered in the last four weeks by sexual problems? No   Do you have difficulty concentrating, remembering or making decisions? No       Age-appropriate Screening Schedule:  Refer to the list below for future screening recommendations based on patient's age, sex and/or medical conditions. Orders for these recommended tests are listed in the plan section. The patient has been provided with a written plan.    Health Maintenance   Topic Date Due   • TDAP/TD VACCINES (2 - Tdap) 09/28/2015   • Pneumococcal Vaccine 65+ (1 - PCV) Never done   • AAA SCREEN (ONE-TIME)  Never done   • INFLUENZA VACCINE  08/01/2023   • ANNUAL WELLNESS VISIT  05/11/2024   • COLORECTAL CANCER SCREENING  01/27/2027   • HEPATITIS C SCREENING  Completed   • COVID-19 Vaccine  Completed   • ZOSTER VACCINE  Completed                  CMS Preventative Services  Quick Reference  Risk Factors Identified During Encounter:  Vision Screening Recommended    The above risks/problems have been discussed with the patient.  Pertinent information has been shared with the patient in the After Visit Summary.    Diagnoses and all orders for this visit:    1. Encounter for subsequent annual wellness visit in Medicare patient (Primary)  -     Cancel: CBC & Differential  -     Cancel: Comprehensive Metabolic Panel  -     Comprehensive Metabolic Panel  -     CBC & Differential    2. Encounter for immunization  -     Pneumococcal Conjugate Vaccine 20-Valent (PCV20)  -     Tdap Vaccine Greater Than or Equal To 6yo IM    3. Screening for AAA (abdominal aortic aneurysm)  -     Abdominal Aortic Aneurysm Screening Medicare CAR; Future    4. Screening for prostate cancer  -     Cancel: PSA Screen  -     PSA Screen    5. Screening for hyperlipidemia  -     Cancel: Lipid Panel  -     Lipid Panel      Follow Up:   Next Medicare Wellness visit to be scheduled in 1 year.      An After Visit Summary and PPPS were made available to the patient.

## 2023-05-12 DIAGNOSIS — R97.20 ELEVATED PSA: Primary | ICD-10-CM

## 2023-05-12 LAB
ALBUMIN SERPL-MCNC: 4.5 G/DL (ref 3.8–4.8)
ALBUMIN/GLOB SERPL: 1.8 {RATIO} (ref 1.2–2.2)
ALP SERPL-CCNC: 60 IU/L (ref 44–121)
ALT SERPL-CCNC: 16 IU/L (ref 0–44)
AST SERPL-CCNC: 22 IU/L (ref 0–40)
BASOPHILS # BLD AUTO: 0 X10E3/UL (ref 0–0.2)
BASOPHILS NFR BLD AUTO: 1 %
BILIRUB SERPL-MCNC: 0.8 MG/DL (ref 0–1.2)
BUN SERPL-MCNC: 12 MG/DL (ref 8–27)
BUN/CREAT SERPL: 11 (ref 10–24)
CALCIUM SERPL-MCNC: 9.3 MG/DL (ref 8.6–10.2)
CHLORIDE SERPL-SCNC: 102 MMOL/L (ref 96–106)
CHOLEST SERPL-MCNC: 182 MG/DL (ref 100–199)
CO2 SERPL-SCNC: 25 MMOL/L (ref 20–29)
CREAT SERPL-MCNC: 1.05 MG/DL (ref 0.76–1.27)
EGFRCR SERPLBLD CKD-EPI 2021: 78 ML/MIN/1.73
EOSINOPHIL # BLD AUTO: 0.1 X10E3/UL (ref 0–0.4)
EOSINOPHIL NFR BLD AUTO: 2 %
ERYTHROCYTE [DISTWIDTH] IN BLOOD BY AUTOMATED COUNT: 11.9 % (ref 11.6–15.4)
GLOBULIN SER CALC-MCNC: 2.5 G/DL (ref 1.5–4.5)
GLUCOSE SERPL-MCNC: 103 MG/DL (ref 70–99)
HCT VFR BLD AUTO: 43.7 % (ref 37.5–51)
HDLC SERPL-MCNC: 69 MG/DL
HGB BLD-MCNC: 14.9 G/DL (ref 13–17.7)
IMM GRANULOCYTES # BLD AUTO: 0 X10E3/UL (ref 0–0.1)
IMM GRANULOCYTES NFR BLD AUTO: 0 %
LDLC SERPL CALC-MCNC: 107 MG/DL (ref 0–99)
LYMPHOCYTES # BLD AUTO: 1.5 X10E3/UL (ref 0.7–3.1)
LYMPHOCYTES NFR BLD AUTO: 32 %
MCH RBC QN AUTO: 31.7 PG (ref 26.6–33)
MCHC RBC AUTO-ENTMCNC: 34.1 G/DL (ref 31.5–35.7)
MCV RBC AUTO: 93 FL (ref 79–97)
MONOCYTES # BLD AUTO: 0.3 X10E3/UL (ref 0.1–0.9)
MONOCYTES NFR BLD AUTO: 7 %
NEUTROPHILS # BLD AUTO: 2.8 X10E3/UL (ref 1.4–7)
NEUTROPHILS NFR BLD AUTO: 58 %
PLATELET # BLD AUTO: 254 X10E3/UL (ref 150–450)
POTASSIUM SERPL-SCNC: 4.8 MMOL/L (ref 3.5–5.2)
PROT SERPL-MCNC: 7 G/DL (ref 6–8.5)
PSA SERPL-MCNC: 4.5 NG/ML (ref 0–4)
RBC # BLD AUTO: 4.7 X10E6/UL (ref 4.14–5.8)
SODIUM SERPL-SCNC: 140 MMOL/L (ref 134–144)
TRIGL SERPL-MCNC: 27 MG/DL (ref 0–149)
VLDLC SERPL CALC-MCNC: 6 MG/DL (ref 5–40)
WBC # BLD AUTO: 4.8 X10E3/UL (ref 3.4–10.8)

## 2023-05-12 NOTE — PROGRESS NOTES
Subjective    Mr. Avalos is 68 y.o. male    Chief Complaint: Elevated PSA    History of Present Illness  68-year-old male new patient referred by Marsha Fu for elevated PSA of 4.5 up from 3.5 last year.  He saw Dr. Montaño in 2016 and 2017 for history of prostatitis.  He denies current bothersome LUTS, dysuria, or hematuria.  No known family history of prostate cancer.    Lab Results   Component Value Date    PSA 4.5 (H) 2023    PSA 3.5 10/28/2022    PSA 3.3 2022       The following portions of the patient's history were reviewed and updated as appropriate: allergies, current medications, past family history, past medical history, past social history, past surgical history and problem list.    Review of Systems      Current Outpatient Medications:   •  cetirizine (zyrTEC) 10 MG tablet, Zyrtec 10 mg tablet  Take 1 tablet every day by oral route., Disp: , Rfl:   •  fluticasone (FLONASE) 50 MCG/ACT nasal spray, INSTILL 2 SPRAYS INTO EACH NOSTRIL DAILY., Disp: 32 mL, Rfl: 0    Past Medical History:   Diagnosis Date   • Allergic        Past Surgical History:   Procedure Laterality Date   • COLONOSCOPY  2009   • COLONOSCOPY N/A 2017    Procedure: COLONOSCOPY WITH ANESTHESIA;  Surgeon: Rajiv Lazcano MD;  Location: [facility] ENDOSCOPY;  Service:    • KNEE SURGERY         Social History     Socioeconomic History   • Marital status:    Tobacco Use   • Smoking status: Former     Packs/day: 0.50     Years: 5.00     Pack years: 2.50     Types: Cigarettes     Start date: 1970     Quit date: 1984     Years since quittin.3     Passive exposure: Never   • Smokeless tobacco: Never   Substance and Sexual Activity   • Alcohol use: Yes     Alcohol/week: 12.0 standard drinks     Types: 3 Glasses of wine, 4 Cans of beer, 5 Shots of liquor per week     Comment: osscaionally   • Drug use: No   • Sexual activity: Yes     Partners: Female     Birth control/protection: Surgical     Comment:  "Vascectomy       Family History   Problem Relation Age of Onset   • Colon polyps Mother    • Cancer Mother    • No Known Problems Father    • No Known Problems Brother    • No Known Problems Daughter    • No Known Problems Daughter    • No Known Problems Daughter        Objective    Temp 98.4 °F (36.9 °C)   Ht 175.3 cm (69\")   Wt 77.1 kg (170 lb)   BMI 25.10 kg/m²     Physical Exam        Results for orders placed or performed in visit on 05/15/23   POC Urinalysis Dipstick, Multipro    Specimen: Urine   Result Value Ref Range    Color Yellow Yellow, Straw, Dark Yellow, Rose    Clarity, UA Clear Clear    Glucose, UA Negative Negative mg/dL    Bilirubin Negative Negative    Ketones, UA Negative Negative    Specific Gravity  1.010 1.005 - 1.030    Blood, UA Negative Negative    pH, Urine 6.5 5.0 - 8.0    Protein, POC Negative Negative mg/dL    Urobilinogen, UA Normal Normal, 0.2 E.U./dL    Nitrite, UA Negative Negative    Leukocytes Negative Negative     Assessment and Plan    Diagnoses and all orders for this visit:    1. Elevated prostate specific antigen (PSA) (Primary)  -     POC Urinalysis Dipstick, Multipro  -     PSA DIAGNOSTIC; Future      Mildly elevated PSA.  We discussed that this could be commensurate with his age and/or false positive.  We discussed the natural history of elevated PSA along with potential causes including possible prostate cancer.  We discussed role of prostate MRI, repeat PSA testing, and prostate biopsy.  We discussed the elevated diagnosed prostate cancers with prostate biopsy.  He would like to follow-up with me in 2 to 3 months in my [facility] with repeat PSA testing for further discussion.  If PSA has not \"normalized,\"  recommend prostate biopsy.    I spent approximately 20 minutes providing clinical care for this patient; including review of patient's chart and provider documentation, face to face time spent with patient in examination room (obtaining history, performing " physical exam, discussing diagnosis and management options), placing orders, and completing patient documentation.       This document has been signed by DERIC Thorpe MD on May 15, 2023 15:50 CDT

## 2023-05-15 ENCOUNTER — OFFICE VISIT (OUTPATIENT)
Dept: UROLOGY | Facility: CLINIC | Age: 68
End: 2023-05-15
Payer: MEDICARE

## 2023-05-15 VITALS — WEIGHT: 170 LBS | HEIGHT: 69 IN | BODY MASS INDEX: 25.18 KG/M2 | TEMPERATURE: 98.4 F

## 2023-05-15 DIAGNOSIS — R97.20 ELEVATED PROSTATE SPECIFIC ANTIGEN (PSA): Primary | ICD-10-CM

## 2023-05-15 PROCEDURE — 1160F RVW MEDS BY RX/DR IN RCRD: CPT | Performed by: UROLOGY

## 2023-05-15 PROCEDURE — 81001 URINALYSIS AUTO W/SCOPE: CPT | Performed by: UROLOGY

## 2023-05-15 PROCEDURE — 1159F MED LIST DOCD IN RCRD: CPT | Performed by: UROLOGY

## 2023-05-15 PROCEDURE — 99202 OFFICE O/P NEW SF 15 MIN: CPT | Performed by: UROLOGY

## 2023-08-08 NOTE — PROGRESS NOTES
Subjective    Mr. Avalos is 68 y.o. male    Chief Complaint: Elevated PSA    History of Present Illness    68-year-old male established patient of Marsha Fu follow-up for elevated PSA of 4.5 up from 3.5 last year.  Repeat PSA last week came back at 4.5.  He saw Dr. Montaño in 2016 and 2017 for history of prostatitis. He denies current bothersome LUTS, dysuria, or hematuria. No known family history of prostate cancer.     Lab Results   Component Value Date    PSA 4.5 (H) 08/10/2023    PSA 4.5 (H) 2023    PSA 3.5 10/28/2022       The following portions of the patient's history were reviewed and updated as appropriate: allergies, current medications, past family history, past medical history, past social history, past surgical history and problem list.    Review of Systems      Current Outpatient Medications:     cetirizine (zyrTEC) 10 MG tablet, Zyrtec 10 mg tablet  Take 1 tablet every day by oral route., Disp: , Rfl:     fluticasone (FLONASE) 50 MCG/ACT nasal spray, INSTILL 2 SPRAYS INTO EACH NOSTRIL DAILY., Disp: 32 mL, Rfl: 0    Past Medical History:   Diagnosis Date    Allergic        Past Surgical History:   Procedure Laterality Date    COLONOSCOPY  2009    COLONOSCOPY N/A 2017    Procedure: COLONOSCOPY WITH ANESTHESIA;  Surgeon: Rajiv Lazcano MD;  Location: [facility] ENDOSCOPY;  Service:     KNEE SURGERY         Social History     Socioeconomic History    Marital status:    Tobacco Use    Smoking status: Former     Packs/day: 0.50     Years: 5.00     Pack years: 2.50     Types: Cigarettes     Start date: 1970     Quit date: 1984     Years since quittin.6     Passive exposure: Never    Smokeless tobacco: Never   Substance and Sexual Activity    Alcohol use: Yes     Alcohol/week: 12.0 standard drinks     Types: 3 Glasses of wine, 4 Cans of beer, 5 Shots of liquor per week     Comment: osscaionally    Drug use: No    Sexual activity: Yes     Partners: Female     Birth  "control/protection: Surgical     Comment: Vascectomy       Family History   Problem Relation Age of Onset    Colon polyps Mother     Cancer Mother     No Known Problems Father     No Known Problems Brother     No Known Problems Daughter     No Known Problems Daughter     No Known Problems Daughter        Objective    Temp 97.2 øF (36.2 øC)   Ht 175.3 cm (69\")   Wt 77.1 kg (170 lb)   BMI 25.10 kg/mý     Physical Exam        Results for orders placed or performed in visit on 08/17/23   POC Urinalysis Dipstick, Multipro    Specimen: Urine   Result Value Ref Range    Color Yellow Yellow, Straw, Dark Yellow, Rose    Clarity, UA Clear Clear    Glucose, UA Negative Negative mg/dL    Bilirubin Negative Negative    Ketones, UA Negative Negative    Specific Gravity  1.020 1.005 - 1.030    Blood, UA Negative Negative    pH, Urine 5.5 5.0 - 8.0    Protein, POC Negative Negative mg/dL    Urobilinogen, UA Normal Normal, 0.2 E.U./dL    Nitrite, UA Negative Negative    Leukocytes Negative Negative     Assessment and Plan    Diagnoses and all orders for this visit:    1. Elevated prostate specific antigen (PSA) (Primary)  -     POC Urinalysis Dipstick, Multipro  -     MRI Pelvis With & Without Contrast; Future      Mildly elevated PSA confirmed on repeat testing.  We again had a long discussion regarding natural history of elevated PSA along with potential causes such as prostate cancer.  I recommended getting a prostate MRI for further evaluation prior to making a decision for biopsy.  I will call him with his MRI results.     I spent approximately 20 minutes providing clinical care for this patient; including review of patient's chart and provider documentation, face to face time spent with patient in examination room (obtaining history, performing physical exam, discussing diagnosis and management options), placing orders, and completing patient documentation.         This document has been signed by DERIC Thorpe MD on " August 17, 2023 12:41 CDT      Prostate MRI done today shows 2 cm PI-RADS 5 lesion left anterior transition zone.  I called the patient and informed him of this MRI results.  I recommended scheduling MRI ultrasound fusion biopsy to evaluate for prostate cancer.  We discussed risks of the procedure including but not limited to infection, bleeding, need for additional procedures, possibility of finding/not finding cancer, complications of anesthesia.  He voiced understanding and provided informed consent to proceed with UroNav fusion biopsy next Friday, 9/22/2023.  I will ask my assistant to call and get him scheduled for this procedure next week.  I instructed him to  his antibiotic to take the night prior to the procedure along with an enema to use rectally the morning of the procedure.  He voiced understanding and agreement.

## 2023-08-10 ENCOUNTER — LAB (OUTPATIENT)
Dept: INTERNAL MEDICINE | Facility: CLINIC | Age: 68
End: 2023-08-10
Payer: MEDICARE

## 2023-08-10 DIAGNOSIS — R97.20 ELEVATED PSA: Primary | ICD-10-CM

## 2023-08-11 LAB — PSA SERPL-MCNC: 4.5 NG/ML (ref 0–4)

## 2023-08-17 ENCOUNTER — OFFICE VISIT (OUTPATIENT)
Dept: UROLOGY | Facility: CLINIC | Age: 68
End: 2023-08-17
Payer: MEDICARE

## 2023-08-17 VITALS — HEIGHT: 69 IN | TEMPERATURE: 97.2 F | BODY MASS INDEX: 25.18 KG/M2 | WEIGHT: 170 LBS

## 2023-08-17 DIAGNOSIS — R97.20 ELEVATED PROSTATE SPECIFIC ANTIGEN (PSA): Primary | ICD-10-CM

## 2023-08-17 LAB
BILIRUB BLD-MCNC: NEGATIVE MG/DL
CLARITY, POC: CLEAR
COLOR UR: YELLOW
GLUCOSE UR STRIP-MCNC: NEGATIVE MG/DL
KETONES UR QL: NEGATIVE
LEUKOCYTE EST, POC: NEGATIVE
NITRITE UR-MCNC: NEGATIVE MG/ML
PH UR: 5.5 [PH] (ref 5–8)
PROT UR STRIP-MCNC: NEGATIVE MG/DL
RBC # UR STRIP: NEGATIVE /UL
SP GR UR: 1.02 (ref 1–1.03)
UROBILINOGEN UR QL: NORMAL

## 2023-08-17 NOTE — LETTER
August 17, 2023     MELANIE Chappell  [address]  [place] [address]    Patient: Miguelangel Avalos   YOB: 1955   Date of Visit: 8/17/2023     Dear MELANIE Hill:    Thank you for referring Miguelangel Avalos to me for evaluation. Below are the relevant portions of my assessment and plan of care.    If you have questions, please do not hesitate to call me. I look forward to following Miguelangel along with you.         Sincerely,        Donato Thorpe MD        CC: No Recipients      Progress Notes:  Subjective    Mr. Avalos is 68 y.o. male    Chief Complaint: Elevated PSA    History of Present Illness    68-year-old male established patient of Marsha Fu follow-up for elevated PSA of 4.5 up from 3.5 last year.  Repeat PSA last week came back at 4.5.  He saw Dr. Montaño in 2016 and 2017 for history of prostatitis. He denies current bothersome LUTS, dysuria, or hematuria. No known family history of prostate cancer.     Lab Results   Component Value Date    PSA 4.5 (H) 08/10/2023    PSA 4.5 (H) 05/11/2023    PSA 3.5 10/28/2022       The following portions of the patient's history were reviewed and updated as appropriate: allergies, current medications, past family history, past medical history, past social history, past surgical history and problem list.    Review of Systems      Current Outpatient Medications:     cetirizine (zyrTEC) 10 MG tablet, Zyrtec 10 mg tablet  Take 1 tablet every day by oral route., Disp: , Rfl:     fluticasone (FLONASE) 50 MCG/ACT nasal spray, INSTILL 2 SPRAYS INTO EACH NOSTRIL DAILY., Disp: 32 mL, Rfl: 0    Past Medical History:   Diagnosis Date    Allergic        Past Surgical History:   Procedure Laterality Date    COLONOSCOPY  12/17/2009    COLONOSCOPY N/A 1/27/2017    Procedure: COLONOSCOPY WITH ANESTHESIA;  Surgeon: Rajiv Lazcano MD;  Location: [facility] ENDOSCOPY;  Service:     KNEE SURGERY         Social History     Socioeconomic History    Marital status:  "   Tobacco Use    Smoking status: Former     Packs/day: 0.50     Years: 5.00     Pack years: 2.50     Types: Cigarettes     Start date: 1970     Quit date: 1984     Years since quittin.6     Passive exposure: Never    Smokeless tobacco: Never   Substance and Sexual Activity    Alcohol use: Yes     Alcohol/week: 12.0 standard drinks     Types: 3 Glasses of wine, 4 Cans of beer, 5 Shots of liquor per week     Comment: osscaionally    Drug use: No    Sexual activity: Yes     Partners: Female     Birth control/protection: Surgical     Comment: Vascectomy       Family History   Problem Relation Age of Onset    Colon polyps Mother     Cancer Mother     No Known Problems Father     No Known Problems Brother     No Known Problems Daughter     No Known Problems Daughter     No Known Problems Daughter        Objective    Temp 97.2 øF (36.2 øC)   Ht 175.3 cm (69\")   Wt 77.1 kg (170 lb)   BMI 25.10 kg/mý     Physical Exam        Results for orders placed or performed in visit on 23   POC Urinalysis Dipstick, Multipro    Specimen: Urine   Result Value Ref Range    Color Yellow Yellow, Straw, Dark Yellow, Rose    Clarity, UA Clear Clear    Glucose, UA Negative Negative mg/dL    Bilirubin Negative Negative    Ketones, UA Negative Negative    Specific Gravity  1.020 1.005 - 1.030    Blood, UA Negative Negative    pH, Urine 5.5 5.0 - 8.0    Protein, POC Negative Negative mg/dL    Urobilinogen, UA Normal Normal, 0.2 E.U./dL    Nitrite, UA Negative Negative    Leukocytes Negative Negative     Assessment and Plan    Diagnoses and all orders for this visit:    1. Elevated prostate specific antigen (PSA) (Primary)  -     POC Urinalysis Dipstick, Multipro  -     MRI Pelvis With & Without Contrast; Future      Mildly elevated PSA confirmed on repeat testing.  We again had a long discussion regarding natural history of elevated PSA along with potential causes such as prostate cancer.  I recommended " getting a prostate MRI for further evaluation prior to making a decision for biopsy.  I will call him with his MRI results.     I spent approximately 20 minutes providing clinical care for this patient; including review of patient's chart and provider documentation, face to face time spent with patient in examination room (obtaining history, performing physical exam, discussing diagnosis and management options), placing orders, and completing patient documentation.         This document has been signed by DERIC Thorpe MD on August 17, 2023 12:41 CDT

## 2023-09-15 ENCOUNTER — HOSPITAL ENCOUNTER (OUTPATIENT)
Dept: MRI IMAGING | Facility: HOSPITAL | Age: 68
Discharge: HOME | End: 2023-09-15
Admitting: UROLOGY
Payer: MEDICARE

## 2023-09-15 DIAGNOSIS — R97.20 ELEVATED PROSTATE SPECIFIC ANTIGEN (PSA): ICD-10-CM

## 2023-09-15 LAB — CREAT BLDA-MCNC: 1.4 MG/DL (ref 0.6–1.3)

## 2023-09-15 PROCEDURE — 72197 MRI PELVIS W/O & W/DYE: CPT

## 2023-09-15 PROCEDURE — 0 GADOBENATE DIMEGLUMINE 529 MG/ML SOLUTION: Performed by: UROLOGY

## 2023-09-15 PROCEDURE — A9577 INJ MULTIHANCE: HCPCS | Performed by: UROLOGY

## 2023-09-15 PROCEDURE — 82565 ASSAY OF CREATININE: CPT

## 2023-09-15 RX ADMIN — GADOBENATE DIMEGLUMINE 15 ML: 529 INJECTION, SOLUTION INTRAVENOUS at 14:15

## 2023-09-18 PROBLEM — R97.20 ELEVATED PROSTATE SPECIFIC ANTIGEN (PSA): Status: ACTIVE | Noted: 2023-09-18

## 2023-09-19 ENCOUNTER — TELEPHONE (OUTPATIENT)
Dept: UROLOGY | Facility: CLINIC | Age: 68
End: 2023-09-19
Payer: MEDICARE

## 2023-09-19 ENCOUNTER — PRE-ADMISSION TESTING (OUTPATIENT)
Dept: PREADMISSION TESTING | Facility: HOSPITAL | Age: 68
End: 2023-09-19
Payer: MEDICARE

## 2023-09-19 VITALS
DIASTOLIC BLOOD PRESSURE: 75 MMHG | BODY MASS INDEX: 25.5 KG/M2 | WEIGHT: 172.18 LBS | HEART RATE: 59 BPM | HEIGHT: 69 IN | OXYGEN SATURATION: 98 % | SYSTOLIC BLOOD PRESSURE: 140 MMHG | RESPIRATION RATE: 16 BRPM

## 2023-09-19 LAB
DEPRECATED RDW RBC AUTO: 43.2 FL (ref 37–54)
ERYTHROCYTE [DISTWIDTH] IN BLOOD BY AUTOMATED COUNT: 12.5 % (ref 12.3–15.4)
HCT VFR BLD AUTO: 40.3 % (ref 37.5–51)
HGB BLD-MCNC: 13.1 G/DL (ref 13–17.7)
MCH RBC QN AUTO: 30.8 PG (ref 26.6–33)
MCHC RBC AUTO-ENTMCNC: 32.5 G/DL (ref 31.5–35.7)
MCV RBC AUTO: 94.6 FL (ref 79–97)
PLATELET # BLD AUTO: 223 10*3/MM3 (ref 140–450)
PMV BLD AUTO: 10.2 FL (ref 6–12)
RBC # BLD AUTO: 4.26 10*6/MM3 (ref 4.14–5.8)
WBC NRBC COR # BLD: 4.2 10*3/MM3 (ref 3.4–10.8)

## 2023-09-19 PROCEDURE — 85027 COMPLETE CBC AUTOMATED: CPT

## 2023-09-19 PROCEDURE — 36415 COLL VENOUS BLD VENIPUNCTURE: CPT

## 2023-09-19 RX ORDER — MULTIPLE VITAMINS W/ MINERALS TAB 9MG-400MCG
1 TAB ORAL DAILY
COMMUNITY

## 2023-09-19 RX ORDER — SACCHAROMYCES BOULARDII 250 MG
250 CAPSULE ORAL 2 TIMES DAILY
COMMUNITY

## 2023-09-19 NOTE — TELEPHONE ENCOUNTER
Called patient to remind them to arrive at patient registration on 9-22-23 at 5am for the procedure with Dr. Thorpe. Spoke with patient. Told patient if they had any questions to please contact our office at [phone].

## 2023-09-19 NOTE — DISCHARGE INSTRUCTIONS
Before you come to the hospital        Arrival time: AS DIRECTED BY OFFICE     YOU MAY TAKE THE FOLLOWING MEDICATION(S) THE MORNING OF SURGERY WITH A SIP OF WATER: ***           ALL OTHER HOME MEDICATION CHECK WITH YOUR PHYSICIAN (especially if   you are taking diabetes medicines or blood thinners)    Do not take any Erectile Dysfunction medications (EX: CIALIS, VIAGRA) 24 hours prior to surgery.      If you were given and instructed to use a germ- killing soap, use as directed the night before surgery and again the morning of surgery or as directed by your surgeon. (Use one-half of the bottle with each shower.)   See attached information for How to Use Chlorhexidine for Bathing if applicable.            Eating and drinking restrictions prior to scheduled arrival time    2 Hours before arrival time STOP   Drinking Clear liquids (water, black coffee-NO CREAM,  apple juice-no pulp)      8 Hours before arrival time STOP   All food, full liquids, and dairy products    (It is extremely important that you follow these guidelines to prevent delay or cancelation of your procedure)     Clear Liquids  Water and flavored water                                                                      Clear Fruit juices, such as cranberry juice and apple juice.  Black coffee (NO cream of any kind, including powdered).  Plain tea  Clear bouillon or broth.  Flavored gelatin.  Soda.  Gatorade or Powerade.  Full liquid examples  Juices that have pulp.  Frozen ice pops that contain fruit pieces.  Coffee with creamer  Milk.  Yogurt.                MANAGING PAIN AFTER SURGERY    We know you are probably wondering what your pain will be like after surgery.  Following surgery it is unrealistic to expect you will not have pain.   Pain is how our bodies let us know that something is wrong or cautions us to be careful.  That said, our goal is to make your pain tolerable.    Methods we may use to treat your pain include (oral or IV medications,  PCAs, epidurals, nerve blocks, etc.)   While some procedures require IV pain medications for a short time after surgery, transitioning to pain medications by mouth allows for better management of pain.   Your nurse will encourage you to take oral pain medications whenever possible.  IV medications work almost immediately, but only last a short while.  Taking medications by mouth allows for a more constant level of medication in your blood stream for a longer period of time.      Once your pain is out of control it is harder to get back under control.  It is important you are aware when your next dose of pain medication is due.  If you are admitted, your nurse may write the time of your next dose on the white board in your room to help you remember.      We are interested in your pain and encourage you to inform us about aggravating factors during your visit.   Many times a simple repositioning every few hours can make a big difference.    If your physician says it is okay, do not let your pain prevent you from getting out of bed. Be sure to call your nurse for assistance prior to getting up so you do not fall.      Before surgery, please decide your tolerable pain goal.  These faces help describe the pain ratings we use on a 0-10 scale.   Be prepared to tell us your goal and whether or not you take pain or anxiety medications at home.          Preparing for Surgery  Preparing for surgery is an important part of your care. It can make things go more smoothly and help you avoid complications. The steps leading up to surgery may vary among hospitals. Follow all instructions given to you by your health care providers. Ask questions if you do not understand something. Talk about any concerns that you have.  Here are some questions to consider asking before your surgery:  If my surgery is not an emergency (is elective), when would be the best time to have the surgery?  What arrangements do I need to make for work, home,  or school?  What will my recovery be like? How long will it be before I can return to normal activities?  Will I need to prepare my home? Will I need to arrange care for me or my children?  Should I expect to have pain after surgery? What are my pain management options? Are there nonmedical options that I can try for pain?  Tell a health care provider about:  Any allergies you have.  All medicines you are taking, including vitamins, herbs, eye drops, creams, and over-the-counter medicines.  Any problems you or family members have had with anesthetic medicines.  Any blood disorders you have.  Any surgeries you have had.  Any medical conditions you have.  Whether you are pregnant or may be pregnant.  What are the risks?  The risks and complications of surgery depend on the specific procedure that you have. Discuss all the risks with your health care providers before your surgery. Ask about common surgical complications, which may include:  Infection.  Bleeding or a need for blood replacement (transfusion).  Allergic reactions to medicines.  Damage to surrounding nerves, tissues, or structures.  A blood clot.  Scarring.  Failure of the surgery to correct the problem.  Follow these instructions before the procedure:  Several days or weeks before your procedure  You may have a physical exam by your primary health care provider to make sure it is safe for you to have surgery.  You may have testing. This may include a chest X-ray, blood and urine tests, electrocardiogram (ECG), or other testing.  Ask your health care provider about:  Changing or stopping your regular medicines. This is especially important if you are taking diabetes medicines or blood thinners.  Taking medicines such as aspirin and ibuprofen. These medicines can thin your blood. Do not take these medicines unless your health care provider tells you to take them.  Taking over-the-counter medicines, vitamins, herbs, and supplements.  Do not use any products  that contain nicotine or tobacco, such as cigarettes and e-cigarettes. If you need help quitting, ask your health care provider.  Avoid alcohol.  Ask your health care provider if there are exercises you can do to prepare for surgery.  Eat a healthy diet.   Plan to have someone 18 years of age or older to take you home from the hospital. We will need to verify your ride on the morning of surgery if you are being discharged home on the same day. Tell your ride to be expecting a call from the hospital prior to your procedure.   Plan to have a responsible adult care for you for at least 24 hours after you leave the hospital or clinic. This is important.  The day before your procedure  You may be given antibiotic medicine to take by mouth to help prevent infection. Take it as told by your health care provider.  You may be asked to shower with a germ-killing soap.  Follow instructions from your health care provider about eating and drinking restrictions. This includes gum, mints and hard candy.  Pack comfortable clothes according to your procedure.   The day of your procedure  You may need to take another shower with a germ-killing soap before you leave home in the morning.  With a small sip of water, take only the medicines that you are told to take.  Remove all jewelry including rings.   Leave anything you consider valuable at home except hearing aids if needed.  You do not need to bring your home medications into the hospital.   Do not wear any makeup, nail polish, powder, deodorant, lotion, hair accessories, or anything on your skin or body except your clothes.  If you will be staying in the hospital, bring a case to hold your glasses, contacts, or dentures. You may also want to bring your robe and non-skid footwear.       (Do not use denture adhesives since you will be asked to remove them during  surgery).   If you wear oxygen at home, bring it with you the day of surgery.  If instructed by your health care  provider, bring your sleep apnea device with you on the day of your surgery (if this applies to you).  You may want to leave your suitcase and sleep apnea device in the car until after surgery.   Arrive at the hospital as scheduled.  Bring a friend or family member with you who can help to answer questions and be present while you meet with your health care provider.  At the hospital  When you arrive at the hospital:  Go to registration located at the main entrance of the hospital. You will be registered and given a beeper and a sticker sheet. Take the stickers to the Outpatient nurses desk and place in the black tray. This is to notify staff that you have arrived. Then return to the lobby to wait.   When your beeper lights up and vibrates proceed through the double doors, under the stairs, and a member of the Outpatient Surgery staff will escort you to your preoperative room.  You may have to wear compression sleeves. These help to prevent blood clots and reduce swelling in your legs.  An IV may be inserted into one of your veins.              In the operating room, you may be given one or more of the following:        A medicine to help you relax (sedative).        A medicine to numb the area (local anesthetic).        A medicine to make you fall asleep (general anesthetic).        A medicine that is injected into an area of your body to numb everything below the                      injection site (regional anesthetic).  You may be given an antibiotic through your IV to help prevent infection.  Your surgical site will be marked or identified.    Contact a health care provider if you:  Develop a fever of more than 100.4°F (38°C) or other feelings of illness during the 48 hours before your surgery.  Have symptoms that get worse.  Have questions or concerns about your surgery.  Summary  Preparing for surgery can make the procedure go more smoothly and lower your risk of complications.  Before surgery, make a list of  questions and concerns to discuss with your surgeon. Ask about the risks and possible complications.  In the days or weeks before your surgery, follow all instructions from your health care provider. You may need to stop smoking, avoid alcohol, follow eating restrictions, and change or stop your regular medicines.  Contact your surgeon if you develop a fever or other signs of illness during the few days before your surgery.  This information is not intended to replace advice given to you by your health care provider. Make sure you discuss any questions you have with your health care provider.  Document Revised: 12/21/2018 Document Reviewed: 10/23/2018  Elsevier Patient Education © 2021 [employer]

## 2023-09-21 PROCEDURE — S0260 H&P FOR SURGERY: HCPCS | Performed by: UROLOGY

## 2023-09-21 PROCEDURE — 76942 ECHO GUIDE FOR BIOPSY: CPT | Performed by: UROLOGY

## 2023-09-21 NOTE — H&P
Subjective     Mr. Avalos is 68 y.o. male     Chief Complaint: Elevated PSA     History of Present Illness     68-year-old male established patient of Marsha Fu follow-up for elevated PSA of 4.5 up from 3.5 last year.  Repeat PSA last week came back at 4.5.  He saw Dr. Montaño in 2016 and 2017 for history of prostatitis. He denies current bothersome LUTS, dysuria, or hematuria. No known family history of prostate cancer.            Lab Results   Component Value Date     PSA 4.5 (H) 08/10/2023     PSA 4.5 (H) 2023     PSA 3.5 10/28/2022         The following portions of the patient's history were reviewed and updated as appropriate: allergies, current medications, past family history, past medical history, past social history, past surgical history and problem list.     Review of Systems        Current Outpatient Medications:     cetirizine (zyrTEC) 10 MG tablet, Zyrtec 10 mg tablet  Take 1 tablet every day by oral route., Disp: , Rfl:     fluticasone (FLONASE) 50 MCG/ACT nasal spray, INSTILL 2 SPRAYS INTO EACH NOSTRIL DAILY., Disp: 32 mL, Rfl: 0     Medical History        Past Medical History:   Diagnosis Date    Allergic              Surgical History         Past Surgical History:   Procedure Laterality Date    COLONOSCOPY   2009    COLONOSCOPY N/A 2017     Procedure: COLONOSCOPY WITH ANESTHESIA;  Surgeon: Rajiv Lazcano MD;  Location: [facility] ENDOSCOPY;  Service:     KNEE SURGERY                Social History   Social History            Socioeconomic History    Marital status:    Tobacco Use    Smoking status: Former       Packs/day: 0.50       Years: 5.00       Pack years: 2.50       Types: Cigarettes       Start date: 1970       Quit date: 1984       Years since quittin.6       Passive exposure: Never    Smokeless tobacco: Never   Substance and Sexual Activity    Alcohol use: Yes       Alcohol/week: 12.0 standard drinks       Types: 3 Glasses of wine, 4 Cans of beer, 5  "Shots of liquor per week       Comment: osscaionally    Drug use: No    Sexual activity: Yes       Partners: Female       Birth control/protection: Surgical       Comment: Vascectomy                  Family History   Problem Relation Age of Onset    Colon polyps Mother      Cancer Mother      No Known Problems Father      No Known Problems Brother      No Known Problems Daughter      No Known Problems Daughter      No Known Problems Daughter           Objective     Temp 97.2 °F (36.2 °C)   Ht 175.3 cm (69\")   Wt 77.1 kg (170 lb)   BMI 25.10 kg/m²      Physical Exam                 Results for orders placed or performed in visit on 08/17/23   POC Urinalysis Dipstick, Multipro     Specimen: Urine   Result Value Ref Range     Color Yellow Yellow, Straw, Dark Yellow, Rose     Clarity, UA Clear Clear     Glucose, UA Negative Negative mg/dL     Bilirubin Negative Negative     Ketones, UA Negative Negative     Specific Gravity  1.020 1.005 - 1.030     Blood, UA Negative Negative     pH, Urine 5.5 5.0 - 8.0     Protein, POC Negative Negative mg/dL     Urobilinogen, UA Normal Normal, 0.2 E.U./dL     Nitrite, UA Negative Negative     Leukocytes Negative Negative      Assessment and Plan     Diagnoses and all orders for this visit:     1. Elevated prostate specific antigen (PSA) (Primary)  -     POC Urinalysis Dipstick, Multipro  -     MRI Pelvis With & Without Contrast; Future        Mildly elevated PSA confirmed on repeat testing.  We again had a long discussion regarding natural history of elevated PSA along with potential causes such as prostate cancer.  I recommended getting a prostate MRI for further evaluation prior to making a decision for biopsy.  I will call him with his MRI results.     I spent approximately 20 minutes providing clinical care for this patient; including review of patient's chart and provider documentation, face to face time spent with patient in examination room (obtaining history, performing " physical exam, discussing diagnosis and management options), placing orders, and completing patient documentation.           This document has been signed by DERIC Thorpe MD on August 17, 2023 12:41 CDT        Prostate MRI done today shows 2 cm PI-RADS 5 lesion left anterior transition zone.  I called the patient and informed him of this MRI results.  I recommended scheduling MRI ultrasound fusion biopsy to evaluate for prostate cancer.  We discussed risks of the procedure including but not limited to infection, bleeding, need for additional procedures, possibility of finding/not finding cancer, complications of anesthesia.  He voiced understanding and provided informed consent to proceed with UroNav fusion biopsy next Friday, 9/22/2023.  I will ask my assistant to call and get him scheduled for this procedure next week.  I instructed him to  his antibiotic to take the night prior to the procedure along with an enema to use rectally the morning of the procedure.  He voiced understanding and agreement.

## 2023-09-22 ENCOUNTER — ANESTHESIA (OUTPATIENT)
Dept: PERIOP | Facility: HOSPITAL | Age: 68
End: 2023-09-22
Payer: MEDICARE

## 2023-09-22 ENCOUNTER — ANESTHESIA EVENT (OUTPATIENT)
Dept: PERIOP | Facility: HOSPITAL | Age: 68
End: 2023-09-22
Payer: MEDICARE

## 2023-09-22 ENCOUNTER — HOSPITAL ENCOUNTER (OUTPATIENT)
Facility: HOSPITAL | Age: 68
Setting detail: HOSPITAL OUTPATIENT SURGERY
Discharge: HOME | End: 2023-09-22
Attending: UROLOGY | Admitting: UROLOGY
Payer: MEDICARE

## 2023-09-22 VITALS
OXYGEN SATURATION: 97 % | DIASTOLIC BLOOD PRESSURE: 64 MMHG | SYSTOLIC BLOOD PRESSURE: 103 MMHG | TEMPERATURE: 97.3 F | RESPIRATION RATE: 16 BRPM | HEART RATE: 55 BPM

## 2023-09-22 DIAGNOSIS — R97.20 ELEVATED PROSTATE SPECIFIC ANTIGEN (PSA): ICD-10-CM

## 2023-09-22 PROCEDURE — 25010000002 FENTANYL CITRATE (PF) 100 MCG/2ML SOLUTION: Performed by: NURSE ANESTHETIST, CERTIFIED REGISTERED

## 2023-09-22 PROCEDURE — 25010000002 PROPOFOL 1000 MG/100ML EMULSION: Performed by: NURSE ANESTHETIST, CERTIFIED REGISTERED

## 2023-09-22 PROCEDURE — 25010000002 GENTAMICIN PER 80 MG: Performed by: UROLOGY

## 2023-09-22 PROCEDURE — 55700 PR PROSTATE NEEDLE BIOPSY ANY APPROACH: CPT | Performed by: UROLOGY

## 2023-09-22 PROCEDURE — 88341 IMHCHEM/IMCYTCHM EA ADD ANTB: CPT | Performed by: UROLOGY

## 2023-09-22 PROCEDURE — 88342 IMHCHEM/IMCYTCHM 1ST ANTB: CPT | Performed by: UROLOGY

## 2023-09-22 PROCEDURE — G0416 PROSTATE BIOPSY, ANY MTHD: HCPCS | Performed by: UROLOGY

## 2023-09-22 RX ORDER — DROPERIDOL 2.5 MG/ML
0.62 INJECTION, SOLUTION INTRAMUSCULAR; INTRAVENOUS ONCE AS NEEDED
Status: DISCONTINUED | OUTPATIENT
Start: 2023-09-22 | End: 2023-09-22 | Stop reason: HOSPADM

## 2023-09-22 RX ORDER — SODIUM CHLORIDE 0.9 % (FLUSH) 0.9 %
3 SYRINGE (ML) INJECTION EVERY 12 HOURS SCHEDULED
Status: DISCONTINUED | OUTPATIENT
Start: 2023-09-22 | End: 2023-09-22 | Stop reason: HOSPADM

## 2023-09-22 RX ORDER — ONDANSETRON 2 MG/ML
4 INJECTION INTRAMUSCULAR; INTRAVENOUS ONCE AS NEEDED
Status: DISCONTINUED | OUTPATIENT
Start: 2023-09-22 | End: 2023-09-22 | Stop reason: HOSPADM

## 2023-09-22 RX ORDER — TRAMADOL HYDROCHLORIDE 50 MG/1
50 TABLET ORAL ONCE AS NEEDED
Status: DISCONTINUED | OUTPATIENT
Start: 2023-09-22 | End: 2023-09-22 | Stop reason: HOSPADM

## 2023-09-22 RX ORDER — PROPOFOL 10 MG/ML
INJECTION, EMULSION INTRAVENOUS AS NEEDED
Status: DISCONTINUED | OUTPATIENT
Start: 2023-09-22 | End: 2023-09-22 | Stop reason: SURG

## 2023-09-22 RX ORDER — LIDOCAINE HYDROCHLORIDE 10 MG/ML
0.5 INJECTION, SOLUTION EPIDURAL; INFILTRATION; INTRACAUDAL; PERINEURAL ONCE AS NEEDED
Status: DISCONTINUED | OUTPATIENT
Start: 2023-09-22 | End: 2023-09-22 | Stop reason: HOSPADM

## 2023-09-22 RX ORDER — HYDROCODONE BITARTRATE AND ACETAMINOPHEN 5; 325 MG/1; MG/1
1 TABLET ORAL ONCE AS NEEDED
Status: DISCONTINUED | OUTPATIENT
Start: 2023-09-22 | End: 2023-09-22 | Stop reason: HOSPADM

## 2023-09-22 RX ORDER — ACETAMINOPHEN 500 MG
1000 TABLET ORAL ONCE
Status: COMPLETED | OUTPATIENT
Start: 2023-09-22 | End: 2023-09-22

## 2023-09-22 RX ORDER — SODIUM CHLORIDE 0.9 % (FLUSH) 0.9 %
3-10 SYRINGE (ML) INJECTION AS NEEDED
Status: DISCONTINUED | OUTPATIENT
Start: 2023-09-22 | End: 2023-09-22 | Stop reason: HOSPADM

## 2023-09-22 RX ORDER — IBUPROFEN 600 MG/1
600 TABLET ORAL ONCE AS NEEDED
Status: DISCONTINUED | OUTPATIENT
Start: 2023-09-22 | End: 2023-09-22 | Stop reason: HOSPADM

## 2023-09-22 RX ORDER — FLUMAZENIL 0.1 MG/ML
0.2 INJECTION INTRAVENOUS AS NEEDED
Status: DISCONTINUED | OUTPATIENT
Start: 2023-09-22 | End: 2023-09-22 | Stop reason: HOSPADM

## 2023-09-22 RX ORDER — SODIUM CHLORIDE, SODIUM LACTATE, POTASSIUM CHLORIDE, CALCIUM CHLORIDE 600; 310; 30; 20 MG/100ML; MG/100ML; MG/100ML; MG/100ML
100 INJECTION, SOLUTION INTRAVENOUS CONTINUOUS
Status: DISCONTINUED | OUTPATIENT
Start: 2023-09-22 | End: 2023-09-22 | Stop reason: HOSPADM

## 2023-09-22 RX ORDER — HYDROCODONE BITARTRATE AND ACETAMINOPHEN 10; 325 MG/1; MG/1
1 TABLET ORAL EVERY 4 HOURS PRN
Status: DISCONTINUED | OUTPATIENT
Start: 2023-09-22 | End: 2023-09-22 | Stop reason: HOSPADM

## 2023-09-22 RX ORDER — NALOXONE HCL 0.4 MG/ML
0.4 VIAL (ML) INJECTION AS NEEDED
Status: DISCONTINUED | OUTPATIENT
Start: 2023-09-22 | End: 2023-09-22 | Stop reason: HOSPADM

## 2023-09-22 RX ORDER — FENTANYL CITRATE 50 UG/ML
25 INJECTION, SOLUTION INTRAMUSCULAR; INTRAVENOUS
Status: DISCONTINUED | OUTPATIENT
Start: 2023-09-22 | End: 2023-09-22 | Stop reason: HOSPADM

## 2023-09-22 RX ORDER — ULTRASOUND COUPLING MEDIUM
GEL (GRAM) TOPICAL AS NEEDED
Status: DISCONTINUED | OUTPATIENT
Start: 2023-09-22 | End: 2023-09-22 | Stop reason: HOSPADM

## 2023-09-22 RX ORDER — LABETALOL HYDROCHLORIDE 5 MG/ML
5 INJECTION, SOLUTION INTRAVENOUS
Status: DISCONTINUED | OUTPATIENT
Start: 2023-09-22 | End: 2023-09-22 | Stop reason: HOSPADM

## 2023-09-22 RX ORDER — MIDAZOLAM HYDROCHLORIDE 1 MG/ML
0.5 INJECTION INTRAMUSCULAR; INTRAVENOUS
Status: DISCONTINUED | OUTPATIENT
Start: 2023-09-22 | End: 2023-09-22 | Stop reason: HOSPADM

## 2023-09-22 RX ORDER — SODIUM CHLORIDE 9 MG/ML
40 INJECTION, SOLUTION INTRAVENOUS AS NEEDED
Status: DISCONTINUED | OUTPATIENT
Start: 2023-09-22 | End: 2023-09-22 | Stop reason: HOSPADM

## 2023-09-22 RX ORDER — SODIUM CHLORIDE, SODIUM LACTATE, POTASSIUM CHLORIDE, CALCIUM CHLORIDE 600; 310; 30; 20 MG/100ML; MG/100ML; MG/100ML; MG/100ML
1000 INJECTION, SOLUTION INTRAVENOUS CONTINUOUS
Status: DISCONTINUED | OUTPATIENT
Start: 2023-09-22 | End: 2023-09-22 | Stop reason: HOSPADM

## 2023-09-22 RX ORDER — FENTANYL CITRATE 50 UG/ML
INJECTION, SOLUTION INTRAMUSCULAR; INTRAVENOUS AS NEEDED
Status: DISCONTINUED | OUTPATIENT
Start: 2023-09-22 | End: 2023-09-22 | Stop reason: SURG

## 2023-09-22 RX ADMIN — GENTAMICIN SULFATE 390 MG: 40 INJECTION, SOLUTION INTRAMUSCULAR; INTRAVENOUS at 06:24

## 2023-09-22 RX ADMIN — FENTANYL CITRATE 50 MCG: 50 INJECTION, SOLUTION INTRAMUSCULAR; INTRAVENOUS at 07:07

## 2023-09-22 RX ADMIN — PROPOFOL 100 MG: 10 INJECTION, EMULSION INTRAVENOUS at 07:05

## 2023-09-22 RX ADMIN — ACETAMINOPHEN 1000 MG: 500 TABLET, FILM COATED ORAL at 06:36

## 2023-09-22 RX ADMIN — PROPOFOL 100 MCG/KG/MIN: 10 INJECTION, EMULSION INTRAVENOUS at 07:06

## 2023-09-22 RX ADMIN — SODIUM CHLORIDE, POTASSIUM CHLORIDE, SODIUM LACTATE AND CALCIUM CHLORIDE 1000 ML: 600; 310; 30; 20 INJECTION, SOLUTION INTRAVENOUS at 06:24

## 2023-09-22 RX ADMIN — FENTANYL CITRATE 50 MCG: 50 INJECTION, SOLUTION INTRAMUSCULAR; INTRAVENOUS at 07:05

## 2023-09-22 NOTE — ANESTHESIA PREPROCEDURE EVALUATION
Anesthesia Evaluation     Patient summary reviewed   no history of anesthetic complications:   NPO Solid Status: > 6 hours             Airway   Mallampati: II  TM distance: >3 FB  Neck ROM: full  no difficulty expected  Dental - normal exam     Pulmonary    (+) a smoker Former,  (-) sleep apnea, no home oxygen  Cardiovascular - negative cardio ROS  Exercise tolerance: excellent (>7 METS)    (-) pacemaker, cardiac stents, CABG      Neuro/Psych- negative ROS  (-) seizures, CVA  GI/Hepatic/Renal/Endo - negative ROS   (-) diabetes    Musculoskeletal (-) negative ROS    Abdominal    Substance History - negative use     OB/GYN negative ob/gyn ROS         Other - negative ROS                       Anesthesia Plan    ASA 2     general     intravenous induction     Anesthetic plan, risks, benefits, and alternatives have been provided, discussed and informed consent has been obtained with: patient.

## 2023-09-22 NOTE — OP NOTE
"Operative Summary    Miguelangel Avalos  Date of Procedure: 9/22/2023    Pre-op Diagnosis:   Elevated prostate specific antigen (PSA) [R97.20]    Post-op Diagnosis:     Post-Op Diagnosis Codes:     * Elevated prostate specific antigen (PSA) [R97.20]    Procedure/CPT® Codes:      Procedure(s):  PROSTATE ULTRASOUND BIOPSY MRI FUSION WITH URONAV    Surgeon(s):  Donato Thorpe MD    Anesthesia: General    Staff:   Circulator: Melony Baxter RN; Lesley Nieves RN  Scrub Person: LaurynKiki purcella    Indications for procedure:  68-year-old male with elevated PSA and a 2 cm PI-RADS 5 lesion left anterior transition zone presenting for UroNav fusion biopsy    Findings:   Height (mm): 28.5  Width (mm): 52.8  Length (mm): 40.0  Volume (cc): 31.4  PSA density: 0.14        Procedure details:  Patient is taken to the operating room where he is given monitored anesthesia care.  He is then placed in the left lateral decubitus position.  Previous MRI images are reviewed as they have been loaded into the [employer] system. The electromagnetic generator for probe detection is attached to the bed and positioned appropriately. Using the B&K ultrasound, the transrectal probe is inserted to identify the mid portion of the prostate gland in the transverse image.  Measurements of the width and height of the gland are then obtained.  The multiplanar probe is then used to take sagittal images of the prostate and again measurement is taken of the length of the gland.  The prostate volume is calculated using height times with times length ×1/2 which is then compared to the volume of the MRI.    The pre-procedural MRI (along with the segmentation and targeting data as determined by the radiologist) selected for fusion biopsy is then overlaid \"fused\" to the 3D TRUS volume of the prostate constructed from a series of 2D TRUS images obtained by a \"sweep\" of the TRUS probe.  Both rigid and elastic registration is carried out using the [employer] " "software.    The region of interest is identified in the left anterior transition zone of the prostate.  The target is identified as drawn by the radiologist using the UroNav software that included the bulls eye with the  saggital setting of the multiplanar probe. 6 biopsies are taken from the region of interest and labeled as \"region of interest #1 \"on the pathology requisition.  The midportion was biopsied with the other biopsies being a few millimeters away toward the periphery of the lesion by adjusting the probe.        After biopsies of  the suspicious lesion(s) was completed, ultrasound-guided \"random \"biopsies were performed again using ultrasound guidance with the saggital setting of the multiplanar probe.  I used a standard 12 core template and labeled the biopsy with respect to location.  Specimens were then placed in formalin.    The systematic of both the random biopsies and those of the region of interest(s) were reviewed and felt to be appropriate sampling of each.  The probe was removed.    Estimated Blood Loss: Minimal    Specimens:                Specimens       ID Source Type Tests Collected By Collected At Frozen?    A Prostate Tissue TISSUE PATHOLOGY EXAM   Donato Thorpe MD 9/22/23 0630     Description: RIGHT BASE      B Prostate Tissue TISSUE PATHOLOGY EXAM   Donato Thorpe MD 9/22/23 0630     Description: RIGHT MID    C Prostate Tissue TISSUE PATHOLOGY EXAM   Donato Thorpe MD 9/22/23 0630     Description: RIGHT APEX    D Prostate Tissue TISSUE PATHOLOGY EXAM   Donato Thorpe MD 9/22/23 0630     Description: LEFT BASE    E Prostate Tissue TISSUE PATHOLOGY EXAM   Donato Thorpe MD 9/22/23 0630     Description: LEFT MID    F Prostate Tissue TISSUE PATHOLOGY EXAM   Donato Thorpe MD 9/22/23 0630     Description: LEFT APEX    G Prostate Tissue TISSUE PATHOLOGY EXAM   Donato Thorpe MD 9/22/23 0721     Description: LESION 1 TARGET1-6              Drains: * No LDAs found " *    Complications: none    Plan: I will call the patient with his pathology results    Donato Thorpe MD     Date: 9/22/2023  Time: 07:42 CDT

## 2023-09-22 NOTE — ANESTHESIA POSTPROCEDURE EVALUATION
Patient: Miguelangel Avalos    Procedure Summary       Date: 09/22/23 Room / Location:  [facility] /  [facility]    Anesthesia Start: 0701 Anesthesia Stop: 0735    Procedure: PROSTATE ULTRASOUND BIOPSY MRI FUSION WITH URONAV Diagnosis:       Elevated prostate specific antigen (PSA)      (Elevated prostate specific antigen (PSA) [R97.20])    Surgeons: Donato Thorpe MD Provider: Jero Elias CRNA    Anesthesia Type: general ASA Status: 2            Anesthesia Type: general    Vitals  Vitals Value Taken Time   /70 09/22/23 0759   Temp 97.3 °F (36.3 °C) 09/22/23 0733   Pulse 61 09/22/23 0801   Resp 12 09/22/23 0752   SpO2 97 % 09/22/23 0801   Vitals shown include unvalidated device data.        Post Anesthesia Care and Evaluation    Patient location during evaluation: PHASE II  Patient participation: complete - patient participated  Level of consciousness: awake and awake and alert  Pain score: 0  Pain management: adequate    Airway patency: patent  Anesthetic complications: No anesthetic complications  PONV Status: none  Cardiovascular status: acceptable  Respiratory status: acceptable  Hydration status: acceptable    Comments: Patient discharged according to acceptable Tristan score per RN assessment. See nursing records for further information.     Blood pressure 106/66, pulse 66, temperature 97.3 °F (36.3 °C), temperature source Temporal, resp. rate 12, SpO2 96 %.

## 2023-09-26 LAB
CYTO UR: NORMAL
LAB AP CASE REPORT: NORMAL
Lab: NORMAL
PATH REPORT.FINAL DX SPEC: NORMAL
PATH REPORT.GROSS SPEC: NORMAL

## 2023-09-28 NOTE — PROGRESS NOTES
Subjective    Mr. Avalos is 68 y.o. male    Chief Complaint: Elevated PSA    History of Present Illness    68-year-old male established patient follow-up for discussion of new problem of clinically localized multifocal [place] 6 prostate cancer identified on UroNav fusion biopsy.  6 of 6 biopsy cores of his MRI lesion contained [place] 6 disease along with right mid, right apex, and left mid biopsies.  Biopsy was done for elevated PSA of 4.5 up from 3.5 last year.  He saw Dr. Montaño in 2016 and 2017 for history of prostatitis. He denies current bothersome LUTS, dysuria, or hematuria. No known family history of prostate cancer.  He denies ED.    The following portions of the patient's history were reviewed and updated as appropriate: allergies, current medications, past family history, past medical history, past social history, past surgical history and problem list.    Review of Systems      Current Outpatient Medications:     fluticasone (FLONASE) 50 MCG/ACT nasal spray, INSTILL 2 SPRAYS INTO EACH NOSTRIL DAILY., Disp: 32 mL, Rfl: 0    multivitamin with minerals tablet tablet, Take 1 tablet by mouth Daily., Disp: , Rfl:     saccharomyces boulardii (FLORASTOR) 250 MG capsule, Take 1 capsule by mouth 2 (Two) Times a Day., Disp: , Rfl:     Past Medical History:   Diagnosis Date    Allergic     Basal cell carcinoma     leg and shoulder       Past Surgical History:   Procedure Laterality Date    COLONOSCOPY  12/17/2009    COLONOSCOPY N/A 01/27/2017    Procedure: COLONOSCOPY WITH ANESTHESIA;  Surgeon: Rajiv Lazcano MD;  Location: [facility] ENDOSCOPY;  Service:     KNEE SURGERY      PROSTATE BIOPSY N/A 9/22/2023    Procedure: PROSTATE ULTRASOUND BIOPSY MRI FUSION WITH URONAV;  Surgeon: Donato Thorpe MD;  Location: [facility] OR;  Service: Urology;  Laterality: N/A;    VASECTOMY         Social History     Socioeconomic History    Marital status:    Tobacco Use    Smoking status: Former     Packs/day: 0.50      Years: 5.00     Pack years: 2.50     Types: Cigarettes     Start date: 1970     Quit date: 1984     Years since quittin.7     Passive exposure: Never    Smokeless tobacco: Never   Substance and Sexual Activity    Alcohol use: Yes     Alcohol/week: 12.0 standard drinks     Types: 3 Glasses of wine, 4 Cans of beer, 5 Shots of liquor per week     Comment: osscaionally    Drug use: No    Sexual activity: Yes     Partners: Female     Birth control/protection: Surgical     Comment: Vascectomy       Family History   Problem Relation Age of Onset    Colon polyps Mother     Cancer Mother     No Known Problems Father     No Known Problems Brother     No Known Problems Daughter     No Known Problems Daughter     No Known Problems Daughter        Objective    There were no vitals taken for this visit.    Physical Exam        Results for orders placed or performed during the hospital encounter of 23   Tissue Pathology Exam    Specimen: A: Prostate; Tissue    B: Prostate; Tissue    C: Prostate; Tissue    D: Prostate; Tissue    E: Prostate; Tissue    F: Prostate; Tissue    G: Prostate; Tissue   Result Value Ref Range    Note to Patients       This report may contain a detailed description of human tissue sent by a health care provider to the laboratory for pathologic evaluation. The content of this report is essential for diagnosis and may provide important critical findings. This information may be unfamiliar to patients to review without a medical professional present. It is advised that the patient review this report in the presence of a health care provider who can answer questions and explain the results.      Case Report       Surgical Pathology Report                         Case: [id number]                                  Authorizing Provider:  Donato Thorpe MD      Collected:           2023 06:30 AM          Ordering Location:     [facility] OR  Received:            2023  09:56 AM          Pathologist:           Yaya Lopez MD                                                      Specimens:   1) - Prostate, RIGHT BASE                                                                           2) - Prostate, RIGHT MID                                                                            3) - Prostate, RIGHT APEX                                                                           4) - Prostate, LEFT BASE                                                                            5) - Prostate, LEFT MID                                                                             6) - Prostate, LEFT APEX                                                                             7) - Prostate, LESION 1 TARGET1-6                                                          Final Diagnosis       1.  Right base prostate, needle biopsies:  Benign prostate glands and stroma.    2.  Right mid prostate, needle biopsies:  Prostate adenocarcinoma, acinar type, Norton score 3+3 (Grade group 1) involving 5% of 1 of 2 cores.    3.  Right apex prostate, needle biopsies:  Prostate adenocarcinoma, acinar type, Norton score 3+3 (Grade group 1) involving less than 15% of 1 of 2 cores.    4.  Left base prostate, needle biopsy:  Benign prostate glands and stroma.    5.  Left mid prostate, needle biopsies:  Prostate adenocarcinoma, acinar type, Bronwyn score 3+3 (Grade group 1) involving 40% and 25% of of 2 of 2 cores.    6.  Left apex prostate, needle biopsies:  Benign prostate glands and stroma.  Chronic inflammation present.    7.  Prostate, target 1 through 6, needle biopsies:  Prostate adenocarcinoma, acinar type, Norton score 3+3 (Grade group 1) involving 50% to 15% of 6 of 6 cores.      Gross Description       1. Prostate.  Received in formalin labeled with the patient's name, date of birth, and “right base prostate needle biopsy”.  The specimen consists of 2 pink/gray soft tissue core  biopsy measuring 1.5 and 1.8 cm in length by less than 0.1 cm in diameter.  The specimen is inked with hematoxylin and totally submitted in (block 1A).  2. Prostate.  Received in formalin labeled with the patient's name, date of birth, and “right mid prostate needle biopsy”.  The specimen consists of 2 pink/gray soft tissue core biopsy measuring 1.5 and 1.8 cm in length by less than 0.1 cm in diameter.  The specimen is inked with hematoxylin and totally submitted in (block 2A).  3. Prostate.  Received in formalin labeled with the patient's name, date of birth, and “right apex prostate needle biopsy”.  The specimen consists of 2 pink/gray soft tissue core biopsy measuring 1.1 and 1.5 cm in length by less than 0.1 cm in diameter.  The specimen is inked with hematoxylin and totally submitted in (block 3A).  4. Prostate.  Received in formalin labeled with the patient's name, date of birth, and “left base prostate needle biopsy”.  The specimen consists of 2 pink/gray soft tissue core biopsy measuring 1.1 and 1.5 cm in length by less than 0.1 cm in diameter.  The specimen is inked with hematoxylin and totally submitted in (block 4A).  5. Prostate.  Received in formalin labeled with the patient's name, date of birth, and “left mid prostate needle biopsy”.  The specimen consists of 2 pink/gray soft tissue core biopsy measuring 1.4 and 1.5 cm in length by less than 0.1 cm in diameter.  The specimen is inked with hematoxylin and totally submitted in (block 5A).  6. Prostate.  Received in formalin labeled with the patient's name, date of birth, and “left apex prostate needle biopsy”.  The specimen consists of 2 pink/gray soft tissue core biopsy measuring 0.9 cm and 1.4 cm in length by less than 0.1 cm in diameter.  The specimen is inked with hematoxylin and totally submitted in (block 6A).  7. Prostate.  Lesion 1 targets received in formalin labeled with the patient's name, date of birth, and “1-6 prostate needle biopsy”.  The  specimen consists of 6 pink/gray soft tissue core biopsy measuring from 1.1 cm to 1.7 cm in length by less than 0.1 cm in diameter.  The specimen is inked with hematoxylin and totally submitted in (blocks 7A and 7B).      Microscopic Description       A microscopic evaluation is performed on each specimen.  Immunohistochemical stains for Amicar and p63 were performed on specimens 2,3, 5, 6, and 7.  The stains demonstrate Invasive adenocarcinoma in biopsies 2, 3, 6, 7A, and 7B..  The controls are good.  These are monoclonal antibody stain; polyclonal antibody stains are not utilized.       Assessment and Plan    Diagnoses and all orders for this visit:    1. Malignant neoplasm of prostate (Primary)  -     POC Urinalysis Dipstick, Multipro        We spent time today discussing the natural history of prostate cancer and all management options including active surveillance, radical prostatectomy, radiation therapy, cryotherapy, and androgen deprivation therapy.  He was provided a copy of his pathology report today.  All questions were answered.  He stated he is leaning toward robotic prostatectomy.  He stated he may want to seek a second opinion.  He will send a [employer] message if he is wanting a referral to [place] or elsewhere for second opinion.    I spent approximately 30 minutes providing clinical care for this patient; including review of patient's chart and provider documentation, face to face time spent with patient in examination room (obtaining history, performing physical exam, discussing diagnosis and management options), placing orders, and completing patient documentation.           This document has been signed by DERIC Thorpe MD on October 2, 2023 13:10 CDT

## 2023-10-02 ENCOUNTER — OFFICE VISIT (OUTPATIENT)
Dept: UROLOGY | Facility: CLINIC | Age: 68
End: 2023-10-02
Payer: MEDICARE

## 2023-10-02 DIAGNOSIS — C61 MALIGNANT NEOPLASM OF PROSTATE: Primary | ICD-10-CM

## 2023-10-02 PROCEDURE — 1160F RVW MEDS BY RX/DR IN RCRD: CPT | Performed by: UROLOGY

## 2023-10-02 PROCEDURE — 1159F MED LIST DOCD IN RCRD: CPT | Performed by: UROLOGY

## 2023-10-02 PROCEDURE — 99214 OFFICE O/P EST MOD 30 MIN: CPT | Performed by: UROLOGY

## 2023-10-02 NOTE — LETTER
October 2, 2023     MELANIE Chappell  [address]  [place] [address]    Patient: Miguelangel Avalos   YOB: 1955   Date of Visit: 10/2/2023     Dear MELANIE Hill:    Thank you for referring Miguelangel Avalos to me for evaluation. Below are the relevant portions of my assessment and plan of care.    If you have questions, please do not hesitate to call me. I look forward to following Miguelangel along with you.         Sincerely,        Donato Thorpe MD        CC: No Recipients      Progress Notes:  Subjective    Mr. Avalos is 68 y.o. male    Chief Complaint: Elevated PSA    History of Present Illness    68-year-old male established patient follow-up for discussion of new problem of clinically localized multifocal [place] 6 prostate cancer identified on UroNav fusion biopsy.  6 of 6 biopsy cores of his MRI lesion contained [place] 6 disease along with right mid, right apex, and left mid biopsies.  Biopsy was done for elevated PSA of 4.5 up from 3.5 last year.  He saw Dr. Montaño in 2016 and 2017 for history of prostatitis. He denies current bothersome LUTS, dysuria, or hematuria. No known family history of prostate cancer.  He denies ED.    The following portions of the patient's history were reviewed and updated as appropriate: allergies, current medications, past family history, past medical history, past social history, past surgical history and problem list.    Review of Systems      Current Outpatient Medications:   •  fluticasone (FLONASE) 50 MCG/ACT nasal spray, INSTILL 2 SPRAYS INTO EACH NOSTRIL DAILY., Disp: 32 mL, Rfl: 0  •  multivitamin with minerals tablet tablet, Take 1 tablet by mouth Daily., Disp: , Rfl:   •  saccharomyces boulardii (FLORASTOR) 250 MG capsule, Take 1 capsule by mouth 2 (Two) Times a Day., Disp: , Rfl:     Past Medical History:   Diagnosis Date   • Allergic    • Basal cell carcinoma     leg and shoulder       Past Surgical History:   Procedure Laterality Date   •  COLONOSCOPY  2009   • COLONOSCOPY N/A 2017    Procedure: COLONOSCOPY WITH ANESTHESIA;  Surgeon: Rajiv Lazcano MD;  Location:  [facility] ENDOSCOPY;  Service:    • KNEE SURGERY     • PROSTATE BIOPSY N/A 2023    Procedure: PROSTATE ULTRASOUND BIOPSY MRI FUSION WITH URONAV;  Surgeon: Donato Thorpe MD;  Location:  [facility] OR;  Service: Urology;  Laterality: N/A;   • VASECTOMY         Social History     Socioeconomic History   • Marital status:    Tobacco Use   • Smoking status: Former     Packs/day: 0.50     Years: 5.00     Pack years: 2.50     Types: Cigarettes     Start date: 1970     Quit date: 1984     Years since quittin.7     Passive exposure: Never   • Smokeless tobacco: Never   Substance and Sexual Activity   • Alcohol use: Yes     Alcohol/week: 12.0 standard drinks     Types: 3 Glasses of wine, 4 Cans of beer, 5 Shots of liquor per week     Comment: osscaionally   • Drug use: No   • Sexual activity: Yes     Partners: Female     Birth control/protection: Surgical     Comment: Vascectomy       Family History   Problem Relation Age of Onset   • Colon polyps Mother    • Cancer Mother    • No Known Problems Father    • No Known Problems Brother    • No Known Problems Daughter    • No Known Problems Daughter    • No Known Problems Daughter        Objective    There were no vitals taken for this visit.    Physical Exam        Results for orders placed or performed during the hospital encounter of 23   Tissue Pathology Exam    Specimen: A: Prostate; Tissue    B: Prostate; Tissue    C: Prostate; Tissue    D: Prostate; Tissue    E: Prostate; Tissue    F: Prostate; Tissue    G: Prostate; Tissue   Result Value Ref Range    Note to Patients       This report may contain a detailed description of human tissue sent by a health care provider to the laboratory for pathologic evaluation. The content of this report is essential for diagnosis and may provide important critical findings.  This information may be unfamiliar to patients to review without a medical professional present. It is advised that the patient review this report in the presence of a health care provider who can answer questions and explain the results.      Case Report       Surgical Pathology Report                         Case: [id number]                                  Authorizing Provider:  Donato Thorpe MD      Collected:           09/22/2023 06:30 AM          Ordering Location:     [facility] OR  Received:            09/22/2023 09:56 AM          Pathologist:           Yaya Lopez MD                                                      Specimens:   1) - Prostate, RIGHT BASE                                                                           2) - Prostate, RIGHT MID                                                                            3) - Prostate, RIGHT APEX                                                                           4) - Prostate, LEFT BASE                                                                            5) - Prostate, LEFT MID                                                                             6) - Prostate, LEFT APEX                                                                             7) - Prostate, LESION 1 TARGET1-6                                                          Final Diagnosis       1.  Right base prostate, needle biopsies:  Benign prostate glands and stroma.    2.  Right mid prostate, needle biopsies:  Prostate adenocarcinoma, acinar type, [place] score 3+3 (Grade group 1) involving 5% of 1 of 2 cores.    3.  Right apex prostate, needle biopsies:  Prostate adenocarcinoma, acinar type, [place] score 3+3 (Grade group 1) involving less than 15% of 1 of 2 cores.    4.  Left base prostate, needle biopsy:  Benign prostate glands and stroma.    5.  Left mid prostate, needle biopsies:  Prostate adenocarcinoma, acinar type, Bronwyn score  3+3 (Grade group 1) involving 40% and 25% of of 2 of 2 cores.    6.  Left apex prostate, needle biopsies:  Benign prostate glands and stroma.  Chronic inflammation present.    7.  Prostate, target 1 through 6, needle biopsies:  Prostate adenocarcinoma, acinar type, [place]gor[place] score 3+3 (Grade group 1) involving 50% to 15% of 6 of 6 cores.      Gross Description       1. Prostate.  Received in formalin labeled with the patient's name, date of birth, and “right base prostate needle biopsy”.  The specimen consists of 2 pink/gray soft tissue core biopsy measuring 1.5 and 1.8 cm in length by less than 0.1 cm in diameter.  The specimen is inked with hematoxylin and totally submitted in (block 1A).  2. Prostate.  Received in formalin labeled with the patient's name, date of birth, and “right mid prostate needle biopsy”.  The specimen consists of 2 pink/gray soft tissue core biopsy measuring 1.5 and 1.8 cm in length by less than 0.1 cm in diameter.  The specimen is inked with hematoxylin and totally submitted in (block 2A).  3. Prostate.  Received in formalin labeled with the patient's name, date of birth, and “right apex prostate needle biopsy”.  The specimen consists of 2 pink/gray soft tissue core biopsy measuring 1.1 and 1.5 cm in length by less than 0.1 cm in diameter.  The specimen is inked with hematoxylin and totally submitted in (block 3A).  4. Prostate.  Received in formalin labeled with the patient's name, date of birth, and “left base prostate needle biopsy”.  The specimen consists of 2 pink/gray soft tissue core biopsy measuring 1.1 and 1.5 cm in length by less than 0.1 cm in diameter.  The specimen is inked with hematoxylin and totally submitted in (block 4A).  5. Prostate.  Received in formalin labeled with the patient's name, date of birth, and “left mid prostate needle biopsy”.  The specimen consists of 2 pink/gray soft tissue core biopsy measuring 1.4 and 1.5 cm in length by less than 0.1 cm in diameter.  The  specimen is inked with hematoxylin and totally submitted in (block 5A).  6. Prostate.  Received in formalin labeled with the patient's name, date of birth, and “left apex prostate needle biopsy”.  The specimen consists of 2 pink/gray soft tissue core biopsy measuring 0.9 cm and 1.4 cm in length by less than 0.1 cm in diameter.  The specimen is inked with hematoxylin and totally submitted in (block 6A).  7. Prostate.  Lesion 1 targets received in formalin labeled with the patient's name, date of birth, and “1-6 prostate needle biopsy”.  The specimen consists of 6 pink/gray soft tissue core biopsy measuring from 1.1 cm to 1.7 cm in length by less than 0.1 cm in diameter.  The specimen is inked with hematoxylin and totally submitted in (blocks 7A and 7B).      Microscopic Description       A microscopic evaluation is performed on each specimen.  Immunohistochemical stains for Amicar and p63 were performed on specimens 2,3, 5, 6, and 7.  The stains demonstrate Invasive adenocarcinoma in biopsies 2, 3, 6, 7A, and 7B..  The controls are good.  These are monoclonal antibody stain; polyclonal antibody stains are not utilized.       Assessment and Plan    Diagnoses and all orders for this visit:    1. Malignant neoplasm of prostate (Primary)  -     POC Urinalysis Dipstick, Multipro        We spent time today discussing the natural history of prostate cancer and all management options including active surveillance, radical prostatectomy, radiation therapy, cryotherapy, and androgen deprivation therapy.  He was provided a copy of his pathology report today.  All questions were answered.  He stated he is leaning toward robotic prostatectomy.  He stated he may want to seek a second opinion.  He will send a [employer] message if he is wanting a referral to [place] or elsewhere for second opinion.    I spent approximately 30 minutes providing clinical care for this patient; including review of patient's chart and provider  documentation, face to face time spent with patient in examination room (obtaining history, performing physical exam, discussing diagnosis and management options), placing orders, and completing patient documentation.           This document has been signed by DERIC Thorpe MD on October 2, 2023 13:10 CDT

## 2023-10-03 DIAGNOSIS — C61 MALIGNANT NEOPLASM OF PROSTATE: Primary | ICD-10-CM

## 2023-10-05 ENCOUNTER — TELEPHONE (OUTPATIENT)
Dept: UROLOGY | Facility: CLINIC | Age: 68
End: 2023-10-05
Payer: MEDICARE

## 2023-10-05 NOTE — TELEPHONE ENCOUNTER
Called pt. To let him know referral had been sent to [facility] by [facility] and that we are just waiting on a response from them and either someone from our office or from [facility] will reach out to pt. Once referral has been accepted and appointment scheduled. Pt. Verbalizes understanding.       ----- Message from Miguelangel Avalos sent at 10/5/2023  8:09 AM CDT -----  Regardinnd opinion prostate biopsy results  Contact: [phone]  Nicole, just checking back in on the referral  to [facility].  Since I haven’t heard back I was wondering if I should contact them for an appointment or wait for your office?

## 2024-01-29 ENCOUNTER — LAB (OUTPATIENT)
Dept: INTERNAL MEDICINE | Facility: CLINIC | Age: 69
End: 2024-01-29
Payer: MEDICARE

## 2024-01-29 DIAGNOSIS — R97.20 ELEVATED PROSTATE SPECIFIC ANTIGEN (PSA): Primary | ICD-10-CM

## 2024-01-29 RX ORDER — CETIRIZINE HYDROCHLORIDE 10 MG/1
10 TABLET ORAL
COMMUNITY

## 2024-01-30 LAB
Lab: NORMAL
PSA SERPL-MCNC: 4.4 NG/ML (ref 0–4)

## 2024-04-25 ENCOUNTER — TELEPHONE (OUTPATIENT)
Dept: INTERNAL MEDICINE | Facility: CLINIC | Age: 69
End: 2024-04-25
Payer: MEDICARE

## 2024-04-25 NOTE — TELEPHONE ENCOUNTER
"  Caller: Miguelangel Avalos \"Jose\"    Relationship: Self    Best call back number: [phone]     What is the best time to reach you: ANY    Who are you requesting to speak with (clinical staff, provider,  specific staff member): NONE SPECIFIED     What was the call regarding:     PATIENT IS WONDERING IF HE NEEDS TO HAVE LAB WORK PERFORMED PRIOR TO 05.16.24 APPOINTMENT? PATIENT STATES HE PREFERS TO HAVE ANY LAB WORK DONE BEFORE THIS APPOINTMENT INSTEAD OF AFTER.     Is it okay if the provider responds through [id number]: YES    "

## 2024-05-09 ENCOUNTER — LAB (OUTPATIENT)
Dept: INTERNAL MEDICINE | Facility: CLINIC | Age: 69
End: 2024-05-09
Payer: MEDICARE

## 2024-05-16 ENCOUNTER — OFFICE VISIT (OUTPATIENT)
Dept: INTERNAL MEDICINE | Facility: CLINIC | Age: 69
End: 2024-05-16
Payer: MEDICARE

## 2024-05-16 VITALS
RESPIRATION RATE: 17 BRPM | HEART RATE: 60 BPM | TEMPERATURE: 97.7 F | HEIGHT: 69 IN | WEIGHT: 166 LBS | OXYGEN SATURATION: 95 % | SYSTOLIC BLOOD PRESSURE: 117 MMHG | BODY MASS INDEX: 24.59 KG/M2 | DIASTOLIC BLOOD PRESSURE: 77 MMHG

## 2024-05-16 DIAGNOSIS — Z00.00 MEDICARE ANNUAL WELLNESS VISIT, SUBSEQUENT: Primary | ICD-10-CM

## 2024-05-16 DIAGNOSIS — R97.20 ELEVATED PROSTATE SPECIFIC ANTIGEN (PSA): ICD-10-CM

## 2024-05-16 PROCEDURE — 1170F FXNL STATUS ASSESSED: CPT | Performed by: NURSE PRACTITIONER

## 2024-05-16 PROCEDURE — G0439 PPPS, SUBSEQ VISIT: HCPCS | Performed by: NURSE PRACTITIONER

## 2024-05-16 PROCEDURE — 1126F AMNT PAIN NOTED NONE PRSNT: CPT | Performed by: NURSE PRACTITIONER

## 2024-05-16 RX ORDER — FLUOROURACIL 50 MG/G
1 CREAM TOPICAL DAILY
COMMUNITY
Start: 2024-01-25

## 2024-05-16 NOTE — PROGRESS NOTES
The ABCs of the Annual Wellness Visit  Subsequent Medicare Wellness Visit    Subjective      Miguelangel Avalos is a 69 y.o. male who presents for a Subsequent Medicare Wellness Visit.    The following portions of the patient's history were reviewed and   updated as appropriate: allergies, current medications, past family history, past medical history, past social history, past surgical history, and problem list.    Compared to one year ago, the patient feels his physical   health is the same.    Compared to one year ago, the patient feels his mental   health is better.    Recent Hospitalizations:  He was not admitted to the hospital during the last year.       Current Medical Providers:  Patient Care Team:  Yuliana Fu APRN as PCP - General (Nurse Practitioner)  Abdelrahman Montaño MD as Consulting Physician (Urology)    Outpatient Medications Prior to Visit   Medication Sig Dispense Refill    fluorouracil (EFUDEX) 5 % cream Apply 1 Application topically to the appropriate area as directed Daily. Please see attached for detailed directions      cetirizine (zyrTEC) 10 MG tablet Take 1 tablet by mouth.      multivitamin with minerals tablet tablet Take 1 tablet by mouth Daily.       No facility-administered medications prior to visit.       No opioid medication identified on active medication list. I have reviewed chart for other potential  high risk medication/s and harmful drug interactions in the elderly.        Aspirin is not on active medication list.  Aspirin use is not indicated based on review of current medical condition/s. Risk of harm outweighs potential benefits.  .    Patient Active Problem List   Diagnosis    Overweight (BMI 25.0-29.9)    Elevated prostate specific antigen (PSA)     Advance Care Planning   Advance Care Planning     Advance Directive is not on file.  ACP discussion was held with the patient during this visit. Patient does not have an advance directive, information provided.    "  Objective    Vitals:    24 0758   BP: 117/77   Pulse: 60   Resp: 17   Temp: 97.7 °F (36.5 °C)   SpO2: 95%   Weight: 75.3 kg (166 lb)   Height: 175 cm (68.9\")     Estimated body mass index is 24.59 kg/m² as calculated from the following:    Height as of this encounter: 175 cm (68.9\").    Weight as of this encounter: 75.3 kg (166 lb).    BMI is within normal parameters. No other follow-up for BMI required.      Does the patient have evidence of cognitive impairment?   No    Lab Results   Component Value Date    CHLPL 178 2024    TRIG 35 2024    HDL 70 2024     (H) 2024    VLDL 8 2024    HGBA1C 5.8 (H) 2024          HEALTH RISK ASSESSMENT    Smoking Status:  Social History     Tobacco Use   Smoking Status Former    Current packs/day: 0.00    Average packs/day: 0.5 packs/day for 14.0 years (7.0 ttl pk-yrs)    Types: Cigarettes    Start date: 1970    Quit date: 1984    Years since quittin.4    Passive exposure: Never   Smokeless Tobacco Never     Alcohol Consumption:  Social History     Substance and Sexual Activity   Alcohol Use Yes    Alcohol/week: 12.0 standard drinks of alcohol    Comment: osscaionally     Fall Risk Screen:    JANICE Fall Risk Assessment was completed, and patient is at LOW risk for falls.Assessment completed on:2024    Depression Screenin/16/2024     7:15 AM   PHQ-2/PHQ-9 Depression Screening   Little Interest or Pleasure in Doing Things 0-->not at all   Feeling Down, Depressed or Hopeless 0-->not at all   PHQ-9: Brief Depression Severity Measure Score 0       Health Habits and Functional and Cognitive Screenin/16/2024     7:16 AM   Functional & Cognitive Status   Do you have difficulty preparing food and eating? No   Do you have difficulty bathing yourself, getting dressed or grooming yourself? No   Do you have difficulty using the toilet? No   Do you have difficulty moving around from place to place? No   Do you " have trouble with steps or getting out of a bed or a chair? No   Current Diet Well Balanced Diet   Dental Exam Up to date   Eye Exam Up to date   Exercise (times per week) Other   Current Exercises Include Other   Do you need help using the phone?  No   Are you deaf or do you have serious difficulty hearing?  No   Do you need help to go to places out of walking distance? No   Do you need help shopping? No   Do you need help preparing meals?  No   Do you need help with housework?  No   Do you need help with laundry? No   Do you need help taking your medications? No   Do you need help managing money? No   Do you ever drive or ride in a car without wearing a seat belt? No   Have you felt unusual stress, anger or loneliness in the last month? No   Who do you live with? Spouse   If you need help, do you have trouble finding someone available to you? No   Have you been bothered in the last four weeks by sexual problems? No   Do you have difficulty concentrating, remembering or making decisions? No       Age-appropriate Screening Schedule:  Refer to the list below for future screening recommendations based on patient's age, sex and/or medical conditions. Orders for these recommended tests are listed in the plan section. The patient has been provided with a written plan.    Health Maintenance   Topic Date Due    RSV Vaccine - Adults (1 - 1-dose 60+ series) Never done    COVID-19 Vaccine (5 - 2023-24 season) 02/27/2024    INFLUENZA VACCINE  08/01/2024    ANNUAL WELLNESS VISIT  05/16/2025    COLORECTAL CANCER SCREENING  01/27/2027    TDAP/TD VACCINES (3 - Td or Tdap) 05/11/2033    HEPATITIS C SCREENING  Completed    Pneumococcal Vaccine 65+  Completed    AAA SCREEN (ONE-TIME)  Completed    ZOSTER VACCINE  Completed                  CMS Preventative Services Quick Reference  Risk Factors Identified During Encounter:    None Identified    The above risks/problems have been discussed with the patient.  Pertinent information has  been shared with the patient in the After Visit Summary.    Diagnoses and all orders for this visit:    1. Medicare annual wellness visit, subsequent (Primary)    2. Elevated prostate specific antigen (PSA)  -     PSA Diagnostic    We were able  to go over his labs.  He does have some prediabetes we discussed dietary changes.  We will repeat his A1c in 1 years time.    Follow Up:   Next Medicare Wellness visit to be scheduled in 1 year.      An After Visit Summary and PPPS were made available to the patient.

## 2024-05-17 LAB — PSA SERPL-MCNC: 4.5 NG/ML (ref 0–4)

## 2025-02-25 ENCOUNTER — OFFICE VISIT (OUTPATIENT)
Dept: INTERNAL MEDICINE | Facility: CLINIC | Age: 70
End: 2025-02-25
Payer: MEDICARE

## 2025-02-25 VITALS
TEMPERATURE: 97.8 F | RESPIRATION RATE: 15 BRPM | DIASTOLIC BLOOD PRESSURE: 80 MMHG | HEIGHT: 69 IN | SYSTOLIC BLOOD PRESSURE: 131 MMHG | OXYGEN SATURATION: 92 % | BODY MASS INDEX: 25.03 KG/M2 | HEART RATE: 73 BPM | WEIGHT: 169 LBS

## 2025-02-25 DIAGNOSIS — R05.1 ACUTE COUGH: Primary | ICD-10-CM

## 2025-02-25 DIAGNOSIS — R50.9 FEVER, UNSPECIFIED FEVER CAUSE: ICD-10-CM

## 2025-02-25 DIAGNOSIS — J10.1 INFLUENZA A: ICD-10-CM

## 2025-02-25 LAB
EXPIRATION DATE: ABNORMAL
FLUAV AG UPPER RESP QL IA.RAPID: DETECTED
FLUBV AG UPPER RESP QL IA.RAPID: NOT DETECTED
INTERNAL CONTROL: ABNORMAL
Lab: ABNORMAL
SARS-COV-2 AG UPPER RESP QL IA.RAPID: NOT DETECTED

## 2025-02-25 PROCEDURE — 99213 OFFICE O/P EST LOW 20 MIN: CPT | Performed by: NURSE PRACTITIONER

## 2025-02-25 PROCEDURE — 1125F AMNT PAIN NOTED PAIN PRSNT: CPT | Performed by: NURSE PRACTITIONER

## 2025-02-25 PROCEDURE — 87428 SARSCOV & INF VIR A&B AG IA: CPT | Performed by: NURSE PRACTITIONER

## 2025-02-25 RX ORDER — OSELTAMIVIR PHOSPHATE 75 MG/1
75 CAPSULE ORAL 2 TIMES DAILY
Qty: 10 CAPSULE | Refills: 0 | Status: SHIPPED | OUTPATIENT
Start: 2025-02-25

## 2025-02-25 NOTE — PROGRESS NOTES
Subjective     Chief Complaint   Patient presents with   • Cough   • Fever   • Headache       History of Present Illness  The patient is a 69-year-old male who presents for evaluation of cough and fever.    He has been experiencing symptoms of cough and fever since Sunday morning, approximately 3 days ago. He reports a slight improvement in his condition today, but his wife insisted on a medical consultation due to his deteriorating state last night. He describes his cough as productive, yielding greenish-yellow phlegm, which he attributes to bronchial congestion. He also reports experiencing ear pressure. He had a mild fever yesterday, with a temperature around 100 degrees Fahrenheit, but did not have a fever this morning. He experienced night sweats last night. He reports no recent illnesses within his household or any recent travel history, except for a trip to [place] two weeks ago. He has received both the influenza and COVID-19 vaccines. He has a history of pneumonia from his younger years. He was previously treated for asthma and allergies, including ragweed, during his youth.     Supplemental Information  He has been under surveillance for prostate cancer and is scheduled to have his prostate removed on 04/24/2025. He has not undergone any major surgeries in the past. He is not currently undergoing chemotherapy or radiation therapy.    ALLERGIES  The patient has allergies to RAGWEED.    IMMUNIZATIONS  The patient has received the influenza and COVID-19 vaccines.    Otherwise complete ROS reviewed and negative except as mentioned in the HPI.    Past Medical History:   Past Medical History:   Diagnosis Date   • Allergic    • Basal cell carcinoma     leg and shoulder   • Benign prostatic hyperplasia      Past Surgical History:  Past Surgical History:   Procedure Laterality Date   • COLONOSCOPY  12/17/2009   • COLONOSCOPY N/A 01/27/2017    Procedure: COLONOSCOPY WITH ANESTHESIA;  Surgeon: Rajiv KAMINSKI  Message left for patient to return call   "MD Jaleesa;  Location: [facility] ENDOSCOPY;  Service:    • KNEE SURGERY     • PROSTATE BIOPSY N/A 9/22/2023    Procedure: PROSTATE ULTRASOUND BIOPSY MRI FUSION WITH URONAV;  Surgeon: Donato Thorpe MD;  Location: [facility] OR;  Service: Urology;  Laterality: N/A;   • VASECTOMY       Social History:  reports that he quit smoking about 41 years ago. His smoking use included cigarettes. He started smoking about 55 years ago. He has a 7 pack-year smoking history. He has never been exposed to tobacco smoke. He has never used smokeless tobacco. He reports current alcohol use of about 12.0 standard drinks of alcohol per week. He reports that he does not use drugs.    Family History: family history includes Cancer in his mother; Colon polyps in his mother; No Known Problems in his brother, daughter, daughter, daughter, and father.       Allergies:  No Known Allergies  Medications:  Prior to Admission medications    Medication Sig Start Date End Date Taking? Authorizing Provider   cetirizine (zyrTEC) 10 MG tablet Take 1 tablet by mouth.    Brisa Loja MD   fluorouracil (EFUDEX) 5 % cream Apply 1 Application topically to the appropriate area as directed Daily. Please see attached for detailed directions 1/25/24   Brisa Loja MD   multivitamin with minerals tablet tablet Take 1 tablet by mouth Daily.    Brisa Loja MD       Objective     Vital Signs: /80   Pulse 73   Temp 97.8 °F (36.6 °C)   Resp 15   Ht 175 cm (68.9\")   Wt 76.7 kg (169 lb)   SpO2 92%   BMI 25.03 kg/m²     Physical Exam  Lungs are clear to auscultation.  Physical Exam  Vitals reviewed.   Constitutional:       Appearance: Normal appearance. He is well-developed.   HENT:      Head: Normocephalic and atraumatic.      Comments: Maxillary tenderness     Right Ear: Tympanic membrane normal.      Left Ear: Tympanic membrane normal.      Nose: Congestion and rhinorrhea present.      Mouth/Throat:      Mouth: Mucous membranes " are moist.   Eyes:      Pupils: Pupils are equal, round, and reactive to light.   Neck:      Vascular: No JVD.   Cardiovascular:      Rate and Rhythm: Normal rate and regular rhythm.      Heart sounds: Normal heart sounds.   Pulmonary:      Effort: Pulmonary effort is normal.      Breath sounds: Normal breath sounds.   Abdominal:      General: Bowel sounds are normal.      Palpations: Abdomen is soft.   Musculoskeletal:         General: No deformity.      Cervical back: Normal range of motion and neck supple.   Lymphadenopathy:      Cervical: No cervical adenopathy.   Skin:     General: Skin is warm and dry.   Neurological:      Mental Status: He is alert and oriented to person, place, and time.   Psychiatric:         Behavior: Behavior normal.         Thought Content: Thought content normal.         Judgment: Judgment normal.     BMI is >= 25 and <30. (Overweight) The following options were offered after discussion;: exercise counseling/recommendations      Results Reviewed:  Glucose   Date Value Ref Range Status   05/09/2024 102 (H) 70 - 99 mg/dL Final     BUN   Date Value Ref Range Status   05/09/2024 12 8 - 27 mg/dL Final     Creatinine   Date Value Ref Range Status   12/03/2024 1.1 0.5 - 1.5 mg/dL Final     Sodium   Date Value Ref Range Status   05/09/2024 140 134 - 144 mmol/L Final     Potassium   Date Value Ref Range Status   05/09/2024 4.4 3.5 - 5.2 mmol/L Final     Chloride   Date Value Ref Range Status   05/09/2024 103 96 - 106 mmol/L Final     Total CO2   Date Value Ref Range Status   05/09/2024 23 20 - 29 mmol/L Final     Calcium   Date Value Ref Range Status   05/09/2024 8.9 8.6 - 10.2 mg/dL Final     ALT (SGPT)   Date Value Ref Range Status   05/09/2024 17 0 - 44 IU/L Final     AST (SGOT)   Date Value Ref Range Status   05/09/2024 18 0 - 40 IU/L Final     WBC   Date Value Ref Range Status   05/09/2024 4.3 3.4 - 10.8 x10E3/uL Final     Hematocrit   Date Value Ref Range Status   05/09/2024 41.5 37.5 -  51.0 % Final   09/19/2023 40.3 37.5 - 51.0 % Final     Platelets   Date Value Ref Range Status   05/09/2024 220 150 - 450 x10E3/uL Final   09/19/2023 223 140 - 450 10*3/mm3 Final     Triglycerides   Date Value Ref Range Status   05/09/2024 35 0 - 149 mg/dL Final     HDL Cholesterol   Date Value Ref Range Status   05/09/2024 70 >39 mg/dL Final     LDL Chol Calc (NIH)   Date Value Ref Range Status   05/09/2024 100 (H) 0 - 99 mg/dL Final     Hemoglobin A1C   Date Value Ref Range Status   05/09/2024 5.8 (H) 4.8 - 5.6 % Final     Comment:              Prediabetes: 5.7 - 6.4           Diabetes: >6.4           Glycemic control for adults with diabetes: <7.0         Results  Laboratory Studies  Positive for Influenza A.      Assessment / Plan     Assessment/Plan:  Diagnoses and all orders for this visit:    1. Acute cough (Primary)    2. Fever, unspecified fever cause  -     POCT SARS-CoV-2 Antigen BI + Flu    3. Influenza A  -     oseltamivir (Tamiflu) 75 MG capsule; Take 1 capsule by mouth 2 (Two) Times a Day.  Dispense: 10 capsule; Refill: 0      Assessment & Plan  1. Influenza A.  He has been experiencing symptoms since Sunday morning, including a cough with greenish-yellow phlegm, fever, and ear pressure. His lungs sound clear, and he is moving air well. He tested positive for Influenza A. A prescription for Tamiflu 75 mg twice a day has been issued. He has been advised to administer one tablet daily as a prophylactic measure. If his wife exhibits symptoms such as fever, she should increase the dosage to twice daily. It was discussed that Tamiflu can be taken concurrently with antihypertensive medications.    No follow-ups on file. unless patient needs to be seen sooner or acute issues arise.    Code Status: Full  Patient or patient representative verbalized consent for the use of Ambient Listening during the visit with  MELANIE Chappell for chart documentation. 2/25/2025  07:55 CST  I have discussed the  patient results/orders and and plan/recommendation with them at today's visit.      Signed by:    MELANIE Chappell Date: 02/25/25

## 2025-05-22 ENCOUNTER — TELEPHONE (OUTPATIENT)
Dept: INTERNAL MEDICINE | Facility: CLINIC | Age: 70
End: 2025-05-22

## 2025-05-22 ENCOUNTER — CLINICAL SUPPORT (OUTPATIENT)
Dept: INTERNAL MEDICINE | Facility: CLINIC | Age: 70
End: 2025-05-22
Payer: MEDICARE

## 2025-05-22 DIAGNOSIS — R30.9 URINARY PAIN: Primary | ICD-10-CM

## 2025-05-22 LAB
BILIRUB BLD-MCNC: NEGATIVE MG/DL
CLARITY, POC: CLEAR
COLOR UR: YELLOW
GLUCOSE UR STRIP-MCNC: NEGATIVE MG/DL
KETONES UR QL: NEGATIVE
LEUKOCYTE EST, POC: NEGATIVE
NITRITE UR-MCNC: NEGATIVE MG/ML
PH UR: 5.5 [PH] (ref 5–8)
PROT UR STRIP-MCNC: NEGATIVE MG/DL
RBC # UR STRIP: NEGATIVE /UL
SP GR UR: 1.01 (ref 1–1.03)
UROBILINOGEN UR QL: NORMAL

## 2025-05-22 PROCEDURE — 81003 URINALYSIS AUTO W/O SCOPE: CPT | Performed by: NURSE PRACTITIONER

## 2025-05-22 NOTE — TELEPHONE ENCOUNTER
PATIENT IS NEEDING A UA CULTURE BC HE'S HAVING PAIN  IN THE GROIN SINCE HE HAD A MONTH AGO A RADICAL PROSECTOMY.     [place] TOLD HIM TO COME IN TO OUR OFFICE FOR A UA TO RULE OUT A UTI.    WAITING FOR ORDERS THEN WILL SCHEDULE APPT.

## 2025-05-22 NOTE — TELEPHONE ENCOUNTER
"  Caller: Miguelangel Avalos \"Jose\"    Relationship: Self    Best call back number: [phone]     What is the best time to reach you: ANY    Who are you requesting to speak with (clinical staff, provider,  specific staff member): NONE SPECIFIED     What was the call regarding:     PATIENT IS WONDERING IF HE NEEDS TO HAVE LABS PERFORMED PRIOR TO 05.29.25 APPOINTMENT OR IF HE WILL HAVE LABS PERFORMED AFTER THIS APPOINTMENT.     Is it okay if the provider responds through [employer]: YES    "

## 2025-05-22 NOTE — PROGRESS NOTES
Patient presents for UA for urinary pain. Specimen collected, clean catch. Patient tolerated well.

## 2025-05-29 ENCOUNTER — OFFICE VISIT (OUTPATIENT)
Dept: INTERNAL MEDICINE | Facility: CLINIC | Age: 70
End: 2025-05-29
Payer: MEDICARE

## 2025-05-29 VITALS
TEMPERATURE: 98 F | DIASTOLIC BLOOD PRESSURE: 79 MMHG | SYSTOLIC BLOOD PRESSURE: 118 MMHG | WEIGHT: 165.2 LBS | OXYGEN SATURATION: 98 % | RESPIRATION RATE: 12 BRPM | BODY MASS INDEX: 24.47 KG/M2 | HEIGHT: 69 IN | HEART RATE: 52 BPM

## 2025-05-29 DIAGNOSIS — E78.5 HYPERLIPIDEMIA, UNSPECIFIED HYPERLIPIDEMIA TYPE: ICD-10-CM

## 2025-05-29 DIAGNOSIS — Z00.00 MEDICARE ANNUAL WELLNESS VISIT, SUBSEQUENT: Primary | ICD-10-CM

## 2025-05-29 DIAGNOSIS — R73.03 PRE-DIABETES: ICD-10-CM

## 2025-05-29 RX ORDER — BETAMETHASONE DIPROPIONATE 0.5 MG/G
1 CREAM TOPICAL 2 TIMES DAILY
Qty: 45 G | Refills: 1 | Status: SHIPPED | OUTPATIENT
Start: 2025-05-29

## 2025-05-29 NOTE — PROGRESS NOTES
Subjective   The ABCs of the Annual Wellness Visit  Medicare Wellness Visit      Miguelangel Avalos is a 70 y.o. patient who presents for a Medicare Wellness Visit.    The following portions of the patient's history were reviewed and   updated as appropriate: allergies, current medications, past family history, past medical history, past social history, past surgical history, and problem list.    Compared to one year ago, the patient's physical   health is the same.  Compared to one year ago, the patient's mental   health is worse.    Recent Hospitalizations:  He was admitted within the past 365 days at [facility].     Current Medical Providers:  Patient Care Team:  Yuliana Fu APRN as PCP - General (Nurse Practitioner)  Abdelrahman Montaño MD as Consulting Physician (Urology)    Outpatient Medications Prior to Visit   Medication Sig Dispense Refill    cetirizine (zyrTEC) 10 MG tablet Take 1 tablet by mouth.      fluorouracil (EFUDEX) 5 % cream Apply 1 Application topically to the appropriate area as directed Daily. Please see attached for detailed directions      multivitamin with minerals tablet tablet Take 1 tablet by mouth Daily.      oseltamivir (Tamiflu) 75 MG capsule Take 1 capsule by mouth 2 (Two) Times a Day. 10 capsule 0     No facility-administered medications prior to visit.     No opioid medication identified on active medication list. I have reviewed chart for other potential  high risk medication/s and harmful drug interactions in the elderly.      Aspirin is not on active medication list.  Aspirin use is not indicated based on review of current medical condition/s. Risk of harm outweighs potential benefits.  .    Patient Active Problem List   Diagnosis    Overweight (BMI 25.0-29.9)    Elevated prostate specific antigen (PSA)     Advance Care Planning Advance Directive is not on file.  ACP discussion was held with the patient during this visit. Patient does not have an  "advance directive, information provided.        Advanced Directives  I discussed with this patient the importance of advanced directives and planning ahead in the event that they are unable to make decisions on their own due to illness or incapacity.  We filled out a MOST form and discussed the different options including DNR, DNI, feeding tubes, Long term iv fluids and the use of antibiotics.  I discussed other options such as a health care surrogate, and or placing time limits on life saving measures should the need arise.  I discussed the importance of having jimenez discussions with their family/healthcare surrogate about their wishes so they are well known.   I spent 16 minutes discussing/counseling these issues with the patient and MOST form will be scanned into the chart, also advised the patient to keep a copy and also make sure local hospital services also have a copy on file.  Patient also advised if they have a copy to their healthcare surrogate if they have chosen one.  Lastly we also advised the patient that if they want to change their wishes at any time that a new MOST form can be drafted to accommodate any change         Objective   Vitals:    05/29/25 0753   BP: 118/79   BP Location: Right arm   Patient Position: Sitting   Cuff Size: Adult   Pulse: 52   Resp: 12   Temp: 98 °F (36.7 °C)   TempSrc: Temporal   SpO2: 98%   Weight: 74.9 kg (165 lb 3.2 oz)   Height: 175 cm (68.9\")   PainSc: 0-No pain       Estimated body mass index is 24.47 kg/m² as calculated from the following:    Height as of this encounter: 175 cm (68.9\").    Weight as of this encounter: 74.9 kg (165 lb 3.2 oz).    BMI is within normal parameters. No other follow-up for BMI required.      Does the patient have evidence of cognitive impairment? No  Lab Results   Component Value Date    GLU 94 03/24/2025                                                                                               Health  Risk Assessment    Smoking " Status:  Social History     Tobacco Use   Smoking Status Former    Current packs/day: 0.00    Average packs/day: 0.5 packs/day for 14.0 years (7.0 ttl pk-yrs)    Types: Cigarettes    Start date: 1970    Quit date: 1984    Years since quittin.4    Passive exposure: Never   Smokeless Tobacco Never     Alcohol Consumption:  Social History     Substance and Sexual Activity   Alcohol Use Yes    Alcohol/week: 12.0 standard drinks of alcohol    Comment: osscaionally       Fall Risk Screen  STEADI Fall Risk Assessment was completed, and patient is at LOW risk for falls.Assessment completed on:2025    Depression Screening   Little interest or pleasure in doing things? Not at all   Feeling down, depressed, or hopeless? Not at all   PHQ-2 Total Score 0      Annual medicare depression screening:   Between Myself and staff 6 minutes were spent doing depression screening and discussing results with patient     Health Habits and Functional and Cognitive Screenin/29/2025     8:00 AM   Functional & Cognitive Status   Do you have difficulty preparing food and eating? No   Do you have difficulty bathing yourself, getting dressed or grooming yourself? No   Do you have difficulty using the toilet? No   Do you have difficulty moving around from place to place? No   Do you have trouble with steps or getting out of a bed or a chair? No   Current Diet Well Balanced Diet   Dental Exam Up to date   Eye Exam Up to date   Exercise (times per week) 5 times per week   Current Exercises Include Walking   Do you need help using the phone?  No   Are you deaf or do you have serious difficulty hearing?  No   Do you need help to go to places out of walking distance? No   Do you need help shopping? No   Do you need help preparing meals?  No   Do you need help with housework?  No   Do you need help with laundry? No   Do you need help taking your medications? No   Do you need help managing money? No   Do you ever drive or ride  in a car without wearing a seat belt? No   Have you felt unusual stress, anger or loneliness in the last month? No   Who do you live with? Spouse   If you need help, do you have trouble finding someone available to you? No   Have you been bothered in the last four weeks by sexual problems? No   Do you have difficulty concentrating, remembering or making decisions? No           Age-appropriate Screening Schedule:  Refer to the list below for future screening recommendations based on patient's age, sex and/or medical conditions. Orders for these recommended tests are listed in the plan section. The patient has been provided with a written plan.    Health Maintenance List  Health Maintenance   Topic Date Due    LIPID PANEL  05/09/2025    COVID-19 Vaccine (6 - 2024-25 season) 06/09/2025    INFLUENZA VACCINE  07/01/2025    ANNUAL WELLNESS VISIT  05/29/2026    COLORECTAL CANCER SCREENING  01/27/2027    TDAP/TD VACCINES (3 - Td or Tdap) 05/11/2033    HEPATITIS C SCREENING  Completed    Pneumococcal Vaccine 50+  Completed    AAA SCREEN ONCE  Completed    ZOSTER VACCINE  Completed                                                                                                                                                CMS Preventative Services Quick Reference  Risk Factors Identified During Encounter  None Identified    The above risks/problems have been discussed with the patient.  Pertinent information has been shared with the patient in the After Visit Summary.  An After Visit Summary and PPPS were made available to the patient.    Follow Up:  Next Medicare Wellness visit to be scheduled in 1 year.    Follow Up:  No follow-ups on file.    Diagnoses and all orders for this visit:    1. Medicare annual wellness visit, subsequent (Primary)  -     Comprehensive Metabolic Panel    2. Hyperlipidemia, unspecified hyperlipidemia type  -     CBC & Differential  -     Lipid Panel    3. Pre-diabetes  -     Hemoglobin A1c  -      TSH

## 2025-05-30 LAB
ALBUMIN SERPL-MCNC: 4.2 G/DL (ref 3.9–4.9)
ALP SERPL-CCNC: 65 IU/L (ref 44–121)
ALT SERPL-CCNC: 14 IU/L (ref 0–44)
AST SERPL-CCNC: 19 IU/L (ref 0–40)
BASOPHILS # BLD AUTO: 0 X10E3/UL (ref 0–0.2)
BASOPHILS NFR BLD AUTO: 1 %
BILIRUB SERPL-MCNC: 0.7 MG/DL (ref 0–1.2)
BUN SERPL-MCNC: 15 MG/DL (ref 8–27)
BUN/CREAT SERPL: 15 (ref 10–24)
CALCIUM SERPL-MCNC: 9.1 MG/DL (ref 8.6–10.2)
CHLORIDE SERPL-SCNC: 104 MMOL/L (ref 96–106)
CHOLEST SERPL-MCNC: 184 MG/DL (ref 100–199)
CO2 SERPL-SCNC: 21 MMOL/L (ref 20–29)
CREAT SERPL-MCNC: 1.03 MG/DL (ref 0.76–1.27)
EGFRCR SERPLBLD CKD-EPI 2021: 79 ML/MIN/1.73
EOSINOPHIL # BLD AUTO: 0.2 X10E3/UL (ref 0–0.4)
EOSINOPHIL NFR BLD AUTO: 5 %
ERYTHROCYTE [DISTWIDTH] IN BLOOD BY AUTOMATED COUNT: 13.1 % (ref 11.6–15.4)
GLOBULIN SER CALC-MCNC: 2.4 G/DL (ref 1.5–4.5)
GLUCOSE SERPL-MCNC: 103 MG/DL (ref 70–99)
HBA1C MFR BLD: 5.2 % (ref 4.8–5.6)
HCT VFR BLD AUTO: 40.5 % (ref 37.5–51)
HDLC SERPL-MCNC: 66 MG/DL
HGB BLD-MCNC: 13.5 G/DL (ref 13–17.7)
IMM GRANULOCYTES # BLD AUTO: 0 X10E3/UL (ref 0–0.1)
IMM GRANULOCYTES NFR BLD AUTO: 0 %
LDLC SERPL CALC-MCNC: 110 MG/DL (ref 0–99)
LYMPHOCYTES # BLD AUTO: 1.3 X10E3/UL (ref 0.7–3.1)
LYMPHOCYTES NFR BLD AUTO: 30 %
MCH RBC QN AUTO: 31.9 PG (ref 26.6–33)
MCHC RBC AUTO-ENTMCNC: 33.3 G/DL (ref 31.5–35.7)
MCV RBC AUTO: 96 FL (ref 79–97)
MONOCYTES # BLD AUTO: 0.3 X10E3/UL (ref 0.1–0.9)
MONOCYTES NFR BLD AUTO: 7 %
NEUTROPHILS # BLD AUTO: 2.4 X10E3/UL (ref 1.4–7)
NEUTROPHILS NFR BLD AUTO: 57 %
PLATELET # BLD AUTO: 225 X10E3/UL (ref 150–450)
POTASSIUM SERPL-SCNC: 4.7 MMOL/L (ref 3.5–5.2)
PROT SERPL-MCNC: 6.6 G/DL (ref 6–8.5)
RBC # BLD AUTO: 4.23 X10E6/UL (ref 4.14–5.8)
SODIUM SERPL-SCNC: 141 MMOL/L (ref 134–144)
TRIGL SERPL-MCNC: 37 MG/DL (ref 0–149)
TSH SERPL DL<=0.005 MIU/L-ACNC: 1.43 UIU/ML (ref 0.45–4.5)
VLDLC SERPL CALC-MCNC: 8 MG/DL (ref 5–40)
WBC # BLD AUTO: 4.2 X10E3/UL (ref 3.4–10.8)

## (undated) DEVICE — BAPTIST TURNOVER KIT: Brand: MEDLINE INDUSTRIES, INC.

## (undated) DEVICE — GLV SURG BIOGEL M LTX PF 7 1/2

## (undated) DEVICE — TOWEL,OR,DSP,ST,BLUE,STD,4/PK,20PK/CS: Brand: MEDLINE

## (undated) DEVICE — PAD,PREPPING,CUFFED,24X48,7",NONSTERILE: Brand: MEDLINE

## (undated) DEVICE — HLDR PROB URONAV

## (undated) DEVICE — SPNG GZ WOVN 4X4IN 12PLY 10/BX STRL

## (undated) DEVICE — MARKR SKIN W/RULR AND LBL

## (undated) DEVICE — MAX-CORE® DISPOSABLE CORE BIOPSY INSTRUMENT, 18G X 25CM: Brand: MAX-CORE